# Patient Record
Sex: FEMALE | Race: OTHER | Employment: FULL TIME | URBAN - METROPOLITAN AREA
[De-identification: names, ages, dates, MRNs, and addresses within clinical notes are randomized per-mention and may not be internally consistent; named-entity substitution may affect disease eponyms.]

---

## 2017-10-10 ENCOUNTER — OFFICE VISIT (OUTPATIENT)
Dept: INTERNAL MEDICINE CLINIC | Age: 37
End: 2017-10-10

## 2017-10-10 VITALS
BODY MASS INDEX: 31.29 KG/M2 | SYSTOLIC BLOOD PRESSURE: 122 MMHG | DIASTOLIC BLOOD PRESSURE: 82 MMHG | TEMPERATURE: 98.2 F | HEIGHT: 63 IN | OXYGEN SATURATION: 98 % | HEART RATE: 69 BPM | WEIGHT: 176.6 LBS

## 2017-10-10 DIAGNOSIS — M25.561 ARTHRALGIA OF BOTH KNEES: Primary | ICD-10-CM

## 2017-10-10 DIAGNOSIS — Z00.00 ENCOUNTER FOR MEDICAL EXAMINATION TO ESTABLISH CARE: ICD-10-CM

## 2017-10-10 DIAGNOSIS — M25.562 ARTHRALGIA OF BOTH KNEES: Primary | ICD-10-CM

## 2017-10-10 DIAGNOSIS — K21.9 GASTROESOPHAGEAL REFLUX DISEASE WITHOUT ESOPHAGITIS: ICD-10-CM

## 2017-10-10 DIAGNOSIS — Z13.1 SCREENING FOR DIABETES MELLITUS: ICD-10-CM

## 2017-10-10 DIAGNOSIS — Z00.00 ROUTINE GENERAL MEDICAL EXAMINATION AT HEALTH CARE FACILITY: ICD-10-CM

## 2017-10-10 DIAGNOSIS — Z13.220 SCREENING FOR LIPID DISORDERS: ICD-10-CM

## 2017-10-10 PROCEDURE — 99203 OFFICE O/P NEW LOW 30 MIN: CPT | Performed by: NURSE PRACTITIONER

## 2017-10-10 RX ORDER — MELOXICAM 15 MG/1
15 TABLET ORAL DAILY
COMMUNITY
End: 2017-10-10 | Stop reason: CLARIF

## 2017-10-10 RX ORDER — IBUPROFEN 800 MG/1
TABLET ORAL
Qty: 80 TABLET | Refills: 1 | Status: SHIPPED | OUTPATIENT
Start: 2017-10-10 | End: 2018-01-15 | Stop reason: SDUPTHER

## 2017-10-10 RX ORDER — OMEPRAZOLE 20 MG/1
20 CAPSULE, DELAYED RELEASE ORAL DAILY
COMMUNITY
End: 2017-10-10 | Stop reason: SDUPTHER

## 2017-10-10 RX ORDER — OMEPRAZOLE 20 MG/1
20 CAPSULE, DELAYED RELEASE ORAL DAILY
Qty: 30 CAPSULE | Refills: 2 | Status: SHIPPED | OUTPATIENT
Start: 2017-10-10 | End: 2018-02-20 | Stop reason: DRUGHIGH

## 2017-10-10 NOTE — PROGRESS NOTES
Subjective:      Patient ID: Blanca Vernon is a 40 y.o. female. Patient presents with:  Established New Doctor    Presents today to establish care medical history consists of pain in her knees wrists elbows and ankles. She is a mother of 3 children, with no other health concerns. Review of Systems   Gastrointestinal:        Current symptoms consist of vomiting acid in the middle of the night with severe burning in chest   Musculoskeletal: Positive for gait problem. Myalgias: knees, elbows, wrists. All other systems reviewed and are negative. Vitals:    10/10/17 0957   BP: 122/82   Pulse: 69   Temp: 98.2 °F (36.8 °C)   SpO2: 98%     Past Medical History:   Diagnosis Date    GERD (gastroesophageal reflux disease)     Knee pain, chronic       Family History   Problem Relation Age of Onset    No Known Problems Mother     Hypertension Father     High Cholesterol Father     Diabetes Father     No Known Problems Sister     No Known Problems Brother     No Known Problems Maternal Grandfather     No Known Problems Paternal Grandmother     No Known Problems Paternal Grandfather     No Known Problems Sister       Social History     Social History    Marital status: N/A     Spouse name: N/A    Number of children: N/A    Years of education: N/A     Occupational History    Not on file. Social History Main Topics    Smoking status: Never Smoker    Smokeless tobacco: Never Used    Alcohol use No    Drug use: No    Sexual activity: Yes     Partners: Male     Birth control/ protection: Surgical     Other Topics Concern    Not on file     Social History Narrative    No narrative on file      Objective:   Physical Exam   Constitutional: She is oriented to person, place, and time. She appears well-developed and well-nourished. Neurological: She is alert and oriented to person, place, and time. Skin: Skin is warm and dry.        Assessment:      Establish care  Joint pain  Ayaka: Discussed importance of weaning self off of medication as soon as possible      Plan:    joint pain    Take ibuprofen with food    Ayaka    Sleep on back   Exercise 3 times a week for 30 minutes    Establish care    Up to date on vaccinations  Return in am for fasting labs

## 2017-10-11 DIAGNOSIS — M25.562 ARTHRALGIA OF BOTH KNEES: ICD-10-CM

## 2017-10-11 DIAGNOSIS — Z00.00 ROUTINE GENERAL MEDICAL EXAMINATION AT HEALTH CARE FACILITY: ICD-10-CM

## 2017-10-11 DIAGNOSIS — Z13.220 SCREENING FOR LIPID DISORDERS: ICD-10-CM

## 2017-10-11 DIAGNOSIS — Z13.1 SCREENING FOR DIABETES MELLITUS: ICD-10-CM

## 2017-10-11 DIAGNOSIS — M25.561 ARTHRALGIA OF BOTH KNEES: ICD-10-CM

## 2017-10-11 LAB
ALBUMIN SERPL-MCNC: 4.3 G/DL (ref 3.4–5)
ANION GAP SERPL CALCULATED.3IONS-SCNC: 17 MMOL/L (ref 3–16)
BUN BLDV-MCNC: 8 MG/DL (ref 7–20)
C-REACTIVE PROTEIN: 7.3 MG/L (ref 0–5.1)
CALCIUM SERPL-MCNC: 9.3 MG/DL (ref 8.3–10.6)
CHLORIDE BLD-SCNC: 97 MMOL/L (ref 99–110)
CHOLESTEROL, TOTAL: 193 MG/DL (ref 0–199)
CO2: 23 MMOL/L (ref 21–32)
CREAT SERPL-MCNC: 0.6 MG/DL (ref 0.6–1.1)
GFR AFRICAN AMERICAN: >60
GFR NON-AFRICAN AMERICAN: >60
GLUCOSE BLD-MCNC: 146 MG/DL (ref 70–99)
HDLC SERPL-MCNC: 40 MG/DL (ref 40–60)
LDL CHOLESTEROL CALCULATED: 111 MG/DL
PHOSPHORUS: 2.6 MG/DL (ref 2.5–4.9)
POTASSIUM SERPL-SCNC: 4.4 MMOL/L (ref 3.5–5.1)
SEDIMENTATION RATE, ERYTHROCYTE: 35 MM/HR (ref 0–20)
SODIUM BLD-SCNC: 137 MMOL/L (ref 136–145)
TRIGL SERPL-MCNC: 212 MG/DL (ref 0–150)
VITAMIN D 25-HYDROXY: 25.3 NG/ML
VLDLC SERPL CALC-MCNC: 42 MG/DL

## 2017-10-12 DIAGNOSIS — E11.9 TYPE 2 DIABETES MELLITUS WITHOUT COMPLICATION, WITHOUT LONG-TERM CURRENT USE OF INSULIN (HCC): Primary | ICD-10-CM

## 2017-10-12 LAB
ESTIMATED AVERAGE GLUCOSE: 171.4 MG/DL
HBA1C MFR BLD: 7.6 %

## 2017-10-12 RX ORDER — BLOOD-GLUCOSE METER
KIT MISCELLANEOUS
Qty: 1 KIT | Refills: 0 | Status: SHIPPED | OUTPATIENT
Start: 2017-10-12

## 2017-10-12 RX ORDER — LANCETS
1 EACH MISCELLANEOUS 2 TIMES DAILY
Qty: 100 EACH | Refills: 3 | Status: SHIPPED | OUTPATIENT
Start: 2017-10-12

## 2017-10-19 DIAGNOSIS — E11.9 TYPE 2 DIABETES MELLITUS WITHOUT COMPLICATION, WITHOUT LONG-TERM CURRENT USE OF INSULIN (HCC): Primary | ICD-10-CM

## 2017-10-19 RX ORDER — LANCETS 33 GAUGE
1 EACH MISCELLANEOUS 2 TIMES DAILY
Qty: 60 EACH | Refills: 3 | Status: SHIPPED | OUTPATIENT
Start: 2017-10-19 | End: 2018-02-20 | Stop reason: SDUPTHER

## 2017-11-14 ENCOUNTER — OFFICE VISIT (OUTPATIENT)
Dept: INTERNAL MEDICINE CLINIC | Age: 37
End: 2017-11-14

## 2017-11-14 VITALS
SYSTOLIC BLOOD PRESSURE: 118 MMHG | WEIGHT: 174 LBS | BODY MASS INDEX: 31.07 KG/M2 | OXYGEN SATURATION: 98 % | HEART RATE: 67 BPM | DIASTOLIC BLOOD PRESSURE: 76 MMHG

## 2017-11-14 DIAGNOSIS — Z23 NEED FOR INFLUENZA VACCINATION: ICD-10-CM

## 2017-11-14 DIAGNOSIS — J30.2 ACUTE SEASONAL ALLERGIC RHINITIS, UNSPECIFIED TRIGGER: ICD-10-CM

## 2017-11-14 DIAGNOSIS — Z23 NEED FOR TDAP VACCINATION: ICD-10-CM

## 2017-11-14 DIAGNOSIS — E11.9 CONTROLLED TYPE 2 DIABETES MELLITUS WITHOUT COMPLICATION, WITHOUT LONG-TERM CURRENT USE OF INSULIN (HCC): Primary | ICD-10-CM

## 2017-11-14 PROCEDURE — 90715 TDAP VACCINE 7 YRS/> IM: CPT | Performed by: NURSE PRACTITIONER

## 2017-11-14 PROCEDURE — 90471 IMMUNIZATION ADMIN: CPT | Performed by: NURSE PRACTITIONER

## 2017-11-14 PROCEDURE — 3045F PR MOST RECENT HEMOGLOBIN A1C LEVEL 7.0-9.0%: CPT | Performed by: NURSE PRACTITIONER

## 2017-11-14 PROCEDURE — 99213 OFFICE O/P EST LOW 20 MIN: CPT | Performed by: NURSE PRACTITIONER

## 2017-11-14 PROCEDURE — G8427 DOCREV CUR MEDS BY ELIG CLIN: HCPCS | Performed by: NURSE PRACTITIONER

## 2017-11-14 PROCEDURE — 90472 IMMUNIZATION ADMIN EACH ADD: CPT | Performed by: NURSE PRACTITIONER

## 2017-11-14 PROCEDURE — G8484 FLU IMMUNIZE NO ADMIN: HCPCS | Performed by: NURSE PRACTITIONER

## 2017-11-14 PROCEDURE — 90686 IIV4 VACC NO PRSV 0.5 ML IM: CPT | Performed by: NURSE PRACTITIONER

## 2017-11-14 PROCEDURE — 1036F TOBACCO NON-USER: CPT | Performed by: NURSE PRACTITIONER

## 2017-11-14 PROCEDURE — G8417 CALC BMI ABV UP PARAM F/U: HCPCS | Performed by: NURSE PRACTITIONER

## 2017-11-14 RX ORDER — LEVOCETIRIZINE DIHYDROCHLORIDE 5 MG/1
5 TABLET, FILM COATED ORAL NIGHTLY
Qty: 30 TABLET | Refills: 2 | Status: SHIPPED | OUTPATIENT
Start: 2017-11-14 | End: 2018-02-20 | Stop reason: SDUPTHER

## 2017-11-14 ASSESSMENT — PATIENT HEALTH QUESTIONNAIRE - PHQ9
1. LITTLE INTEREST OR PLEASURE IN DOING THINGS: 0
SUM OF ALL RESPONSES TO PHQ QUESTIONS 1-9: 0
SUM OF ALL RESPONSES TO PHQ9 QUESTIONS 1 & 2: 0
2. FEELING DOWN, DEPRESSED OR HOPELESS: 0

## 2017-11-14 ASSESSMENT — ENCOUNTER SYMPTOMS: SINUS PRESSURE: 1

## 2017-11-14 NOTE — PATIENT INSTRUCTIONS
Allergies  -start taking Xyzal nightly   Diabetes  -Take Metformin as prescribed  -Call the office with concerns regarding medications before stopping  Knee pain  -Start taking ibuprofen 800 mg three times daily with food as previously prescribed  -Apply heat to knee at least twice daily  Start taking xyzal nightly          Vaccine Information Sheet, \"Influenza - Inactivated\"  given to Sharri Alfaro, or parent/legal guardian of  Sharri Alfaro and verbalized understanding. Patient responses:    Have you ever had a reaction to a flu vaccine? No  Are you able to eat eggs without adverse effects? Yes  Do you have any current illness? No  Have you ever had Guillian [place] Syndrome? No    Flu vaccine given per order. Please see immunization tab.

## 2017-12-13 NOTE — PATIENT INSTRUCTIONS
Take ibuprofen with food  Sleep on back   Exercise 3 times a week for 30 minutes  Return in am for fasting labs
102

## 2018-01-19 RX ORDER — IBUPROFEN 800 MG/1
TABLET ORAL
Qty: 80 TABLET | Refills: 0 | Status: SHIPPED | OUTPATIENT
Start: 2018-01-19 | End: 2018-02-20 | Stop reason: SDUPTHER

## 2018-02-20 ENCOUNTER — OFFICE VISIT (OUTPATIENT)
Dept: INTERNAL MEDICINE CLINIC | Age: 38
End: 2018-02-20

## 2018-02-20 VITALS
OXYGEN SATURATION: 98 % | HEART RATE: 90 BPM | BODY MASS INDEX: 30.71 KG/M2 | SYSTOLIC BLOOD PRESSURE: 100 MMHG | WEIGHT: 172 LBS | DIASTOLIC BLOOD PRESSURE: 78 MMHG

## 2018-02-20 DIAGNOSIS — R51.9 INTRACTABLE HEADACHE, UNSPECIFIED CHRONICITY PATTERN, UNSPECIFIED HEADACHE TYPE: ICD-10-CM

## 2018-02-20 DIAGNOSIS — K21.00 GASTROESOPHAGEAL REFLUX DISEASE WITH ESOPHAGITIS: ICD-10-CM

## 2018-02-20 DIAGNOSIS — J30.2 ACUTE SEASONAL ALLERGIC RHINITIS, UNSPECIFIED TRIGGER: ICD-10-CM

## 2018-02-20 DIAGNOSIS — E11.9 TYPE 2 DIABETES MELLITUS WITHOUT COMPLICATION, WITHOUT LONG-TERM CURRENT USE OF INSULIN (HCC): Primary | ICD-10-CM

## 2018-02-20 PROCEDURE — G8417 CALC BMI ABV UP PARAM F/U: HCPCS | Performed by: NURSE PRACTITIONER

## 2018-02-20 PROCEDURE — G8427 DOCREV CUR MEDS BY ELIG CLIN: HCPCS | Performed by: NURSE PRACTITIONER

## 2018-02-20 PROCEDURE — 99214 OFFICE O/P EST MOD 30 MIN: CPT | Performed by: NURSE PRACTITIONER

## 2018-02-20 PROCEDURE — G8484 FLU IMMUNIZE NO ADMIN: HCPCS | Performed by: NURSE PRACTITIONER

## 2018-02-20 PROCEDURE — 1036F TOBACCO NON-USER: CPT | Performed by: NURSE PRACTITIONER

## 2018-02-20 PROCEDURE — 3046F HEMOGLOBIN A1C LEVEL >9.0%: CPT | Performed by: NURSE PRACTITIONER

## 2018-02-20 RX ORDER — LEVOCETIRIZINE DIHYDROCHLORIDE 5 MG/1
5 TABLET, FILM COATED ORAL NIGHTLY
Qty: 30 TABLET | Refills: 2 | Status: SHIPPED | OUTPATIENT
Start: 2018-02-20 | End: 2018-09-27 | Stop reason: SDUPTHER

## 2018-02-20 RX ORDER — OMEPRAZOLE 40 MG/1
40 CAPSULE, DELAYED RELEASE ORAL DAILY
Qty: 30 CAPSULE | Refills: 3 | Status: SHIPPED | OUTPATIENT
Start: 2018-02-20 | End: 2018-09-27 | Stop reason: SDUPTHER

## 2018-02-20 RX ORDER — LANCETS 33 GAUGE
1 EACH MISCELLANEOUS 2 TIMES DAILY
Qty: 60 EACH | Refills: 3 | Status: SHIPPED | OUTPATIENT
Start: 2018-02-20

## 2018-02-20 RX ORDER — IBUPROFEN 800 MG/1
TABLET ORAL
Qty: 60 TABLET | Refills: 0 | Status: SHIPPED | OUTPATIENT
Start: 2018-02-20 | End: 2018-09-27 | Stop reason: SDUPTHER

## 2018-02-20 RX ORDER — METFORMIN HYDROCHLORIDE 500 MG/1
2000 TABLET, EXTENDED RELEASE ORAL
Qty: 120 TABLET | Refills: 0 | Status: SHIPPED | OUTPATIENT
Start: 2018-02-20 | End: 2018-04-04 | Stop reason: SDUPTHER

## 2018-02-20 ASSESSMENT — ENCOUNTER SYMPTOMS: DIARRHEA: 1

## 2018-02-20 NOTE — PROGRESS NOTES
 Sed Rate 10/11/2017 35*       Physical Exam   HENT:   Mouth/Throat: Oropharynx is clear and moist.   Cardiovascular: Normal rate and regular rhythm. Pulmonary/Chest: Effort normal and breath sounds normal.   Abdominal: Soft. Bowel sounds are normal.       ASSESSMENT/PLAN:      Diagnoses and all orders for this visit:    Type 2 diabetes mellitus without complication, without long-term current use of insulin (HCC)  -     glucose blood VI test strips (ASCENSIA AUTODISC VI;ONE TOUCH ULTRA TEST VI) strip; 1 each by In Vitro route daily As needed. -     Andreia CARBAJAL 25I MISC; 1 applicator by Does not apply route 2 times daily  -     MICROALBUMIN, RANDOM URINE (W/O CREATININE); Future  -     Hemoglobin A1C; Future  -     metFORMIN (GLUCOPHAGE-XR) 500 MG extended release tablet; Take 4 tablets by mouth daily (with breakfast)-changed to long acting metformin to decrease GI symptoms    Gastroesophageal reflux disease with esophagitis  -     omeprazole (PRILOSEC) 40 MG delayed release capsule; Take 1 capsule by mouth daily  Increased dose to 40 mg from 20 mg    Acute seasonal allergic rhinitis, unspecified trigger  -     levocetirizine (XYZAL) 5 MG tablet; Take 1 tablet by mouth nightly    Intractable headache, unspecified chronicity pattern, unspecified headache type  -     ibuprofen (ADVIL;MOTRIN) 800 MG tablet; TAKE ONE TABLET BY MOUTH EVERY 8 HOURS AS NEEDED FOR PAIN       I have spent a total 25 minutes face-to-face with this patient and/or guardian. Over 50% of this time was spent on counseling and care coordination re: diabetes management and medication.      F/u with BLAINE Gamboa for diabetes in 6-8 weeks

## 2018-02-21 DIAGNOSIS — E11.9 TYPE 2 DIABETES MELLITUS WITHOUT COMPLICATION, WITHOUT LONG-TERM CURRENT USE OF INSULIN (HCC): ICD-10-CM

## 2018-02-21 LAB
CREATININE URINE: 173.1 MG/DL (ref 28–259)
ESTIMATED AVERAGE GLUCOSE: 151.3 MG/DL
HBA1C MFR BLD: 6.9 %
MICROALBUMIN UR-MCNC: 2.9 MG/DL
MICROALBUMIN/CREAT UR-RTO: 16.8 MG/G (ref 0–30)

## 2018-04-05 RX ORDER — LANCETS
EACH MISCELLANEOUS
Qty: 100 EACH | Refills: 2 | Status: SHIPPED | OUTPATIENT
Start: 2018-04-05

## 2018-04-16 ENCOUNTER — OFFICE VISIT (OUTPATIENT)
Dept: INTERNAL MEDICINE CLINIC | Age: 38
End: 2018-04-16

## 2018-04-16 VITALS
DIASTOLIC BLOOD PRESSURE: 84 MMHG | SYSTOLIC BLOOD PRESSURE: 112 MMHG | BODY MASS INDEX: 31.07 KG/M2 | WEIGHT: 174 LBS | OXYGEN SATURATION: 98 % | HEART RATE: 77 BPM

## 2018-04-16 DIAGNOSIS — Z23 NEED FOR 23-POLYVALENT PNEUMOCOCCAL POLYSACCHARIDE VACCINE: Primary | ICD-10-CM

## 2018-04-16 DIAGNOSIS — Z23 NEED FOR PROPHYLACTIC VACCINATION AGAINST STREPTOCOCCUS PNEUMONIAE (PNEUMOCOCCUS): ICD-10-CM

## 2018-04-16 DIAGNOSIS — E11.9 TYPE 2 DIABETES MELLITUS WITHOUT COMPLICATION, WITHOUT LONG-TERM CURRENT USE OF INSULIN (HCC): ICD-10-CM

## 2018-04-16 LAB — HBA1C MFR BLD: 7.9 %

## 2018-04-16 PROCEDURE — 83036 HEMOGLOBIN GLYCOSYLATED A1C: CPT | Performed by: NURSE PRACTITIONER

## 2018-04-16 PROCEDURE — 3044F HG A1C LEVEL LT 7.0%: CPT | Performed by: NURSE PRACTITIONER

## 2018-04-16 PROCEDURE — 90471 IMMUNIZATION ADMIN: CPT | Performed by: NURSE PRACTITIONER

## 2018-04-16 PROCEDURE — 1036F TOBACCO NON-USER: CPT | Performed by: NURSE PRACTITIONER

## 2018-04-16 PROCEDURE — 99213 OFFICE O/P EST LOW 20 MIN: CPT | Performed by: NURSE PRACTITIONER

## 2018-04-16 PROCEDURE — G8427 DOCREV CUR MEDS BY ELIG CLIN: HCPCS | Performed by: NURSE PRACTITIONER

## 2018-04-16 PROCEDURE — 2022F DILAT RTA XM EVC RTNOPTHY: CPT | Performed by: NURSE PRACTITIONER

## 2018-04-16 PROCEDURE — G8417 CALC BMI ABV UP PARAM F/U: HCPCS | Performed by: NURSE PRACTITIONER

## 2018-04-16 PROCEDURE — 90732 PPSV23 VACC 2 YRS+ SUBQ/IM: CPT | Performed by: NURSE PRACTITIONER

## 2018-07-23 ENCOUNTER — OFFICE VISIT (OUTPATIENT)
Dept: INTERNAL MEDICINE CLINIC | Age: 38
End: 2018-07-23

## 2018-07-23 VITALS — HEART RATE: 89 BPM | DIASTOLIC BLOOD PRESSURE: 70 MMHG | SYSTOLIC BLOOD PRESSURE: 114 MMHG | OXYGEN SATURATION: 98 %

## 2018-07-23 DIAGNOSIS — E11.9 TYPE 2 DIABETES MELLITUS WITHOUT COMPLICATION, WITHOUT LONG-TERM CURRENT USE OF INSULIN (HCC): Primary | ICD-10-CM

## 2018-07-23 LAB — GLUCOSE BLD-MCNC: 254 MG/DL

## 2018-07-23 PROCEDURE — 99213 OFFICE O/P EST LOW 20 MIN: CPT | Performed by: NURSE PRACTITIONER

## 2018-07-23 PROCEDURE — 82962 GLUCOSE BLOOD TEST: CPT | Performed by: NURSE PRACTITIONER

## 2018-07-23 NOTE — PATIENT INSTRUCTIONS
Need to exercise  Continue to exercise for 30 minutes, twice a week  Return in a.m. for lab work    CPap smear to be scheduled in 2 months  I reports sent to office ontinue to watch food portions

## 2018-07-24 DIAGNOSIS — E11.9 TYPE 2 DIABETES MELLITUS WITHOUT COMPLICATION, WITHOUT LONG-TERM CURRENT USE OF INSULIN (HCC): ICD-10-CM

## 2018-07-24 LAB
CHOLESTEROL, TOTAL: 190 MG/DL (ref 0–199)
ESTIMATED AVERAGE GLUCOSE: 159.9 MG/DL
HBA1C MFR BLD: 7.2 %
HDLC SERPL-MCNC: 45 MG/DL (ref 40–60)
LDL CHOLESTEROL CALCULATED: 113 MG/DL
TRIGL SERPL-MCNC: 161 MG/DL (ref 0–150)
VLDLC SERPL CALC-MCNC: 32 MG/DL

## 2018-09-27 ENCOUNTER — OFFICE VISIT (OUTPATIENT)
Dept: INTERNAL MEDICINE CLINIC | Age: 38
End: 2018-09-27
Payer: COMMERCIAL

## 2018-09-27 VITALS
HEART RATE: 83 BPM | DIASTOLIC BLOOD PRESSURE: 70 MMHG | OXYGEN SATURATION: 98 % | WEIGHT: 169 LBS | SYSTOLIC BLOOD PRESSURE: 110 MMHG | BODY MASS INDEX: 30.18 KG/M2

## 2018-09-27 DIAGNOSIS — E11.9 TYPE 2 DIABETES MELLITUS WITHOUT COMPLICATION, WITHOUT LONG-TERM CURRENT USE OF INSULIN (HCC): ICD-10-CM

## 2018-09-27 DIAGNOSIS — Z01.419 ENCOUNTER FOR WELL WOMAN EXAM WITH ROUTINE GYNECOLOGICAL EXAM: Primary | ICD-10-CM

## 2018-09-27 DIAGNOSIS — K21.00 GASTROESOPHAGEAL REFLUX DISEASE WITH ESOPHAGITIS: ICD-10-CM

## 2018-09-27 DIAGNOSIS — Z23 NEED FOR INFLUENZA VACCINATION: ICD-10-CM

## 2018-09-27 DIAGNOSIS — R51.9 INTRACTABLE HEADACHE, UNSPECIFIED CHRONICITY PATTERN, UNSPECIFIED HEADACHE TYPE: ICD-10-CM

## 2018-09-27 PROCEDURE — 90471 IMMUNIZATION ADMIN: CPT | Performed by: NURSE PRACTITIONER

## 2018-09-27 PROCEDURE — 90686 IIV4 VACC NO PRSV 0.5 ML IM: CPT | Performed by: NURSE PRACTITIONER

## 2018-09-27 PROCEDURE — 99395 PREV VISIT EST AGE 18-39: CPT | Performed by: NURSE PRACTITIONER

## 2018-09-27 RX ORDER — LEVOCETIRIZINE DIHYDROCHLORIDE 5 MG/1
5 TABLET, FILM COATED ORAL NIGHTLY
Qty: 30 TABLET | Refills: 2 | Status: SHIPPED | OUTPATIENT
Start: 2018-09-27 | End: 2019-03-27

## 2018-09-27 RX ORDER — OMEPRAZOLE 40 MG/1
40 CAPSULE, DELAYED RELEASE ORAL DAILY
Qty: 30 CAPSULE | Refills: 3 | Status: SHIPPED | OUTPATIENT
Start: 2018-09-27 | End: 2019-03-27

## 2018-09-27 RX ORDER — IBUPROFEN 800 MG/1
TABLET ORAL
Qty: 60 TABLET | Refills: 0 | Status: SHIPPED | OUTPATIENT
Start: 2018-09-27 | End: 2020-01-13 | Stop reason: SDUPTHER

## 2018-09-27 ASSESSMENT — PATIENT HEALTH QUESTIONNAIRE - PHQ9
2. FEELING DOWN, DEPRESSED OR HOPELESS: 0
SUM OF ALL RESPONSES TO PHQ QUESTIONS 1-9: 0
SUM OF ALL RESPONSES TO PHQ QUESTIONS 1-9: 0
1. LITTLE INTEREST OR PLEASURE IN DOING THINGS: 0
SUM OF ALL RESPONSES TO PHQ9 QUESTIONS 1 & 2: 0

## 2018-10-04 ENCOUNTER — TELEPHONE (OUTPATIENT)
Dept: INTERNAL MEDICINE CLINIC | Age: 38
End: 2018-10-04

## 2019-03-27 ENCOUNTER — OFFICE VISIT (OUTPATIENT)
Dept: INTERNAL MEDICINE CLINIC | Age: 39
End: 2019-03-27

## 2019-03-27 VITALS
DIASTOLIC BLOOD PRESSURE: 80 MMHG | HEART RATE: 91 BPM | SYSTOLIC BLOOD PRESSURE: 104 MMHG | OXYGEN SATURATION: 98 % | BODY MASS INDEX: 29.82 KG/M2 | WEIGHT: 167 LBS

## 2019-03-27 DIAGNOSIS — R51.9 INTRACTABLE HEADACHE, UNSPECIFIED CHRONICITY PATTERN, UNSPECIFIED HEADACHE TYPE: ICD-10-CM

## 2019-03-27 DIAGNOSIS — K21.00 GASTROESOPHAGEAL REFLUX DISEASE WITH ESOPHAGITIS: ICD-10-CM

## 2019-03-27 DIAGNOSIS — E11.9 TYPE 2 DIABETES MELLITUS WITHOUT COMPLICATION, WITHOUT LONG-TERM CURRENT USE OF INSULIN (HCC): Primary | ICD-10-CM

## 2019-03-27 PROCEDURE — 99212 OFFICE O/P EST SF 10 MIN: CPT | Performed by: NURSE PRACTITIONER

## 2019-03-27 RX ORDER — LEVOCETIRIZINE DIHYDROCHLORIDE 5 MG/1
5 TABLET, FILM COATED ORAL NIGHTLY
Qty: 30 TABLET | Refills: 2 | Status: CANCELLED | OUTPATIENT
Start: 2019-03-27

## 2019-03-27 RX ORDER — OMEPRAZOLE 40 MG/1
40 CAPSULE, DELAYED RELEASE ORAL DAILY
Qty: 30 CAPSULE | Refills: 3 | Status: CANCELLED | OUTPATIENT
Start: 2019-03-27

## 2019-03-27 RX ORDER — IBUPROFEN 800 MG/1
TABLET ORAL
Qty: 60 TABLET | Refills: 0 | Status: CANCELLED | OUTPATIENT
Start: 2019-03-27

## 2019-03-27 ASSESSMENT — PATIENT HEALTH QUESTIONNAIRE - PHQ9
SUM OF ALL RESPONSES TO PHQ QUESTIONS 1-9: 0
SUM OF ALL RESPONSES TO PHQ9 QUESTIONS 1 & 2: 0
2. FEELING DOWN, DEPRESSED OR HOPELESS: 0
1. LITTLE INTEREST OR PLEASURE IN DOING THINGS: 0
SUM OF ALL RESPONSES TO PHQ QUESTIONS 1-9: 0

## 2019-03-27 ASSESSMENT — ENCOUNTER SYMPTOMS: BLURRED VISION: 0

## 2020-01-08 ENCOUNTER — NURSE ONLY (OUTPATIENT)
Dept: INTERNAL MEDICINE CLINIC | Age: 40
End: 2020-01-08

## 2020-01-08 PROCEDURE — 90686 IIV4 VACC NO PRSV 0.5 ML IM: CPT | Performed by: NURSE PRACTITIONER

## 2020-01-08 PROCEDURE — 90471 IMMUNIZATION ADMIN: CPT | Performed by: NURSE PRACTITIONER

## 2020-01-13 ENCOUNTER — TELEPHONE (OUTPATIENT)
Dept: INTERNAL MEDICINE CLINIC | Age: 40
End: 2020-01-13

## 2020-01-13 RX ORDER — IBUPROFEN 800 MG/1
TABLET ORAL
Qty: 60 TABLET | Refills: 0 | Status: SHIPPED | OUTPATIENT
Start: 2020-01-13 | End: 2020-03-04 | Stop reason: SDUPTHER

## 2020-01-13 NOTE — TELEPHONE ENCOUNTER
Patient needs refill of ibuprofen 800 and metformin 1000, patient has a new pharmacy [address] [phone] patient is [facility] march 3

## 2020-03-04 ENCOUNTER — OFFICE VISIT (OUTPATIENT)
Dept: INTERNAL MEDICINE CLINIC | Age: 40
End: 2020-03-04

## 2020-03-04 VITALS
HEART RATE: 74 BPM | RESPIRATION RATE: 16 BRPM | SYSTOLIC BLOOD PRESSURE: 122 MMHG | WEIGHT: 169.8 LBS | TEMPERATURE: 98.2 F | BODY MASS INDEX: 30.32 KG/M2 | DIASTOLIC BLOOD PRESSURE: 66 MMHG | OXYGEN SATURATION: 98 %

## 2020-03-04 LAB — HBA1C MFR BLD: 6.8 %

## 2020-03-04 PROCEDURE — 99213 OFFICE O/P EST LOW 20 MIN: CPT | Performed by: INTERNAL MEDICINE

## 2020-03-04 PROCEDURE — 83036 HEMOGLOBIN GLYCOSYLATED A1C: CPT | Performed by: INTERNAL MEDICINE

## 2020-03-04 RX ORDER — ATORVASTATIN CALCIUM 10 MG/1
10 TABLET, FILM COATED ORAL DAILY
Qty: 90 TABLET | Refills: 1 | Status: SHIPPED | OUTPATIENT
Start: 2020-03-04 | End: 2020-09-08 | Stop reason: SDUPTHER

## 2020-03-04 RX ORDER — IBUPROFEN 800 MG/1
TABLET ORAL
Qty: 60 TABLET | Refills: 5 | Status: SHIPPED | OUTPATIENT
Start: 2020-03-04 | End: 2020-09-08

## 2020-03-04 RX ORDER — OMEPRAZOLE 40 MG/1
40 CAPSULE, DELAYED RELEASE ORAL DAILY
Qty: 90 CAPSULE | Refills: 1 | Status: SHIPPED | OUTPATIENT
Start: 2020-03-04 | End: 2020-09-08 | Stop reason: SDUPTHER

## 2020-03-04 ASSESSMENT — PATIENT HEALTH QUESTIONNAIRE - PHQ9
SUM OF ALL RESPONSES TO PHQ QUESTIONS 1-9: 0
1. LITTLE INTEREST OR PLEASURE IN DOING THINGS: 0
SUM OF ALL RESPONSES TO PHQ9 QUESTIONS 1 & 2: 0
SUM OF ALL RESPONSES TO PHQ QUESTIONS 1-9: 0
2. FEELING DOWN, DEPRESSED OR HOPELESS: 0

## 2020-03-04 NOTE — PROGRESS NOTES
(GLUCOPHAGE) 1000 MG tablet Take 1 tablet by mouth 2 times daily (with meals) 180 tablet 0    ibuprofen (ADVIL;MOTRIN) 800 MG tablet TAKE ONE TABLET BY MOUTH EVERY 8 HOURS AS NEEDED FOR PAIN 60 tablet 0    esomeprazole (NEXIUM) 20 MG delayed release capsule Take 1 capsule by mouth every morning (before breakfast) 40 capsule 0    KROGER LANCETS ULTRATHIN 30G MISC USE TO TEST TWO TIMES A  each 2    glucose blood VI test strips (ASCENSIA AUTODISC VI;ONE TOUCH ULTRA TEST VI) strip 1 each by In Vitro route daily As needed. 100 each 3    ONETOUCH DELICA LANCETS 52Y MISC 1 applicator by Does not apply route 2 times daily 60 each 3    Blood Glucose Monitoring Suppl KIT One touch glucometer kit 1 kit 0    glucose monitoring kit (FREESTYLE) monitoring kit Accu-Chek glucometer kit 1 kit 0    Accu-Chek Multiclix Lancets MISC 1 applicator by Does not apply route 2 times daily 100 each 3     No current facility-administered medications for this visit. Vitals:    03/04/20 0926   BP: 122/66   Pulse: 74   Resp: 16   Temp: 98.2 °F (36.8 °C)   TempSrc: Oral   SpO2: 98%   Weight: 169 lb 12.8 oz (77 kg)     Body mass index is 30.32 kg/m². Wt Readings from Last 3 Encounters:   03/04/20 169 lb 12.8 oz (77 kg)   03/27/19 167 lb (75.8 kg)   09/27/18 169 lb (76.7 kg)     BP Readings from Last 3 Encounters:   03/04/20 122/66   03/27/19 104/80   09/27/18 110/70       Objective:   Physical Exam  Vitals signs and nursing note reviewed. Constitutional:       Appearance: She is well-developed. HENT:      Head: Normocephalic and atraumatic. Right Ear: External ear normal.      Left Ear: External ear normal.      Nose: Nose normal.   Eyes:      Conjunctiva/sclera: Conjunctivae normal.      Pupils: Pupils are equal, round, and reactive to light. Neck:      Musculoskeletal: Normal range of motion and neck supple. Thyroid: No thyromegaly. Cardiovascular:      Rate and Rhythm: Normal rate and regular rhythm.

## 2020-03-04 NOTE — PATIENT INSTRUCTIONS
Patient Education        Noninsulin Medicines for Type 2 Diabetes: Care Instructions  Your Care Instructions    There are different types of noninsulin medicines for diabetes. Each works in a different way. But they all help you control your blood sugar. Some types help your body make insulin to lower your blood sugar. Others lower how much insulin your body needs. Some can slow how fast your body digests sugars. And some can remove extra glucose through your urine. · Alpha-glucosidase inhibitors. These keep starches from breaking down. This means that they lower the amount of glucose absorbed when you eat. They don't help your body make more insulin. So they will not cause low blood sugar unless you use them with other medicines for diabetes. They include acarbose and miglitol. · DPP-4 inhibitors. These help your body raise the level of insulin after you eat. They also help your body make less of a hormone that raises blood sugar. They include linagliptin, saxagliptin, and sitagliptin. · Incretin hormones (GLP-1 receptor agonists). Your body makes a protein that can raise your insulin level. It also can lower your blood sugar and make you less hungry. You can get shots of hormones that work the same way. They include exenatide and liraglutide. · Meglitinides. These help your body release insulin. They also help slow how your body digests sugars. So they can keep your blood sugar from rising too fast after you eat. They include nateglinide and repaglinide. · Metformin. This lowers how much glucose your liver makes. And it helps you respond better to insulin. It also lowers the amount of stored sugar that your liver releases when you are not eating. · SGLT2 inhibitors. These help to remove extra glucose through your urine. They may also help some people lose weight. They include canagliflozin, dapagliflozin, and empagliflozin. · Sulfonylureas. These help your body release more insulin.  Some work for [employer] · Your blood sugar stays outside the level your doctor set for you.     · You have any problems. Where can you learn more? Go to [email or web address] and sign in to your R[employer] account. Enter H153 in the [facility] box to learn more about \"Noninsulin Medicines for Type 2 Diabetes: Care Instructions. \"     If you do not have an account, please click on the \"Sign Up Now\" link. Current as of: April 16, 2019  Content Version: 12.3  © 7020-1185 [employer]. Care instructions adapted under license by [facility] ([facility]). If you have questions about a medical condition or this instruction, always ask your healthcare professional. [address] any warranty or liability for your use of this information. Patient Education        Learning About the Risk of Heart Attack and Stroke With Diabetes  How are diabetes, heart attack, and stroke connected? For some people, diabetes can cause problems that increase the risk of a heart attack or stroke. Many things can lead to a heart attack or stroke. These include high blood sugar, insulin resistance, high cholesterol, and high blood pressure. Lifestyle and genetics may also play a part. But here's the good news: The things you're doing to stay healthy with diabetes also help your heart and blood vessels. That means eating healthy foods, quitting smoking, and getting exercise. What increases your risk for heart attack and stroke? When you have diabetes, your risk for heart attack and stroke is even higher if you have:  · High blood pressure. It pushes blood through the arteries with too much force. Over time, this damages the walls of the arteries. · High cholesterol. It causes the buildup of a kind of fat inside the blood vessel walls. This buildup can lower blood flow to the heart muscle and raise your risk for having a heart attack or stroke. · Kidney damage.  It shares many of the risk factors for healthcare professional. [address] any warranty or liability for your use of this information. Patient Education        Noninsulin Medicines for Type 2 Diabetes: Care Instructions  Your Care Instructions    There are different types of noninsulin medicines for diabetes. Each works in a different way. But they all help you control your blood sugar. Some types help your body make insulin to lower your blood sugar. Others lower how much insulin your body needs. Some can slow how fast your body digests sugars. And some can remove extra glucose through your urine. · Alpha-glucosidase inhibitors. These keep starches from breaking down. This means that they lower the amount of glucose absorbed when you eat. They don't help your body make more insulin. So they will not cause low blood sugar unless you use them with other medicines for diabetes. They include acarbose and miglitol. · DPP-4 inhibitors. These help your body raise the level of insulin after you eat. They also help your body make less of a hormone that raises blood sugar. They include linagliptin, saxagliptin, and sitagliptin. · Incretin hormones (GLP-1 receptor agonists). Your body makes a protein that can raise your insulin level. It also can lower your blood sugar and make you less hungry. You can get shots of hormones that work the same way. They include exenatide and liraglutide. · Meglitinides. These help your body release insulin. They also help slow how your body digests sugars. So they can keep your blood sugar from rising too fast after you eat. They include nateglinide and repaglinide. · Metformin. This lowers how much glucose your liver makes. And it helps you respond better to insulin. It also lowers the amount of stored sugar that your liver releases when you are not eating. · SGLT2 inhibitors. These help to remove extra glucose through your urine. They may also help some people lose weight.  They include · Your blood sugar is very high or very low.    Watch closely for changes in your health, and be sure to contact your doctor if:    · Your blood sugar stays outside the level your doctor set for you.     · You have any problems. Where can you learn more? Go to [email or web address].[email or web address].com. org and sign in to your [employer] account. Enter H153 in the [employer] box to learn more about \"Noninsulin Medicines for Type 2 Diabetes: Care Instructions. \"     If you do not have an account, please click on the \"Sign Up Now\" link. Current as of: April 16, 2019  Content Version: 12.3  © 9901-1275 [employer]. Care instructions adapted under license by [facility] ([facility]). If you have questions about a medical condition or this instruction, always ask your healthcare professional. [address] any warranty or liability for your use of this information.

## 2020-03-26 ASSESSMENT — ENCOUNTER SYMPTOMS
NAUSEA: 1
EYES NEGATIVE: 1
BACK PAIN: 0

## 2020-09-08 ENCOUNTER — OFFICE VISIT (OUTPATIENT)
Dept: INTERNAL MEDICINE CLINIC | Age: 40
End: 2020-09-08

## 2020-09-08 VITALS — DIASTOLIC BLOOD PRESSURE: 80 MMHG | SYSTOLIC BLOOD PRESSURE: 116 MMHG | WEIGHT: 165 LBS | BODY MASS INDEX: 29.46 KG/M2

## 2020-09-08 LAB
A/G RATIO: 1.5 (ref 1.1–2.2)
ALBUMIN SERPL-MCNC: 4.6 G/DL (ref 3.4–5)
ALP BLD-CCNC: 131 U/L (ref 40–129)
ALT SERPL-CCNC: 52 U/L (ref 10–40)
ANION GAP SERPL CALCULATED.3IONS-SCNC: 13 MMOL/L (ref 3–16)
AST SERPL-CCNC: 44 U/L (ref 15–37)
BILIRUB SERPL-MCNC: 0.4 MG/DL (ref 0–1)
BUN BLDV-MCNC: 10 MG/DL (ref 7–20)
CALCIUM SERPL-MCNC: 9.9 MG/DL (ref 8.3–10.6)
CHLORIDE BLD-SCNC: 101 MMOL/L (ref 99–110)
CHOLESTEROL, FASTING: 135 MG/DL (ref 0–199)
CO2: 24 MMOL/L (ref 21–32)
CREAT SERPL-MCNC: 0.6 MG/DL (ref 0.6–1.1)
CREATININE URINE: 206.6 MG/DL (ref 28–259)
GFR AFRICAN AMERICAN: >60
GFR NON-AFRICAN AMERICAN: >60
GLOBULIN: 3 G/DL
GLUCOSE FASTING: 135 MG/DL (ref 70–99)
HDLC SERPL-MCNC: 49 MG/DL (ref 40–60)
LDL CHOLESTEROL CALCULATED: 66 MG/DL
MICROALBUMIN UR-MCNC: 2 MG/DL
MICROALBUMIN/CREAT UR-RTO: 9.7 MG/G (ref 0–30)
POTASSIUM SERPL-SCNC: 4.8 MMOL/L (ref 3.5–5.1)
SODIUM BLD-SCNC: 138 MMOL/L (ref 136–145)
TOTAL PROTEIN: 7.6 G/DL (ref 6.4–8.2)
TRIGLYCERIDE, FASTING: 99 MG/DL (ref 0–150)
VLDLC SERPL CALC-MCNC: 20 MG/DL

## 2020-09-08 PROCEDURE — 99213 OFFICE O/P EST LOW 20 MIN: CPT | Performed by: NURSE PRACTITIONER

## 2020-09-08 RX ORDER — AMITRIPTYLINE HYDROCHLORIDE 10 MG/1
TABLET, FILM COATED ORAL
Qty: 60 TABLET | Refills: 1 | Status: SHIPPED | OUTPATIENT
Start: 2020-09-08 | End: 2021-01-07 | Stop reason: ALTCHOICE

## 2020-09-08 RX ORDER — CLOBETASOL PROPIONATE 0.5 MG/G
OINTMENT TOPICAL
Qty: 30 G | Refills: 1 | Status: SHIPPED | OUTPATIENT
Start: 2020-09-08 | End: 2021-01-07 | Stop reason: ALTCHOICE

## 2020-09-08 RX ORDER — OMEPRAZOLE 40 MG/1
40 CAPSULE, DELAYED RELEASE ORAL DAILY
Qty: 90 CAPSULE | Refills: 1 | Status: SHIPPED | OUTPATIENT
Start: 2020-09-08 | End: 2021-01-07 | Stop reason: SDUPTHER

## 2020-09-08 RX ORDER — IBUPROFEN 800 MG/1
TABLET ORAL
Qty: 60 TABLET | Refills: 5 | Status: CANCELLED | OUTPATIENT
Start: 2020-09-08

## 2020-09-08 RX ORDER — ATORVASTATIN CALCIUM 10 MG/1
10 TABLET, FILM COATED ORAL DAILY
Qty: 90 TABLET | Refills: 1 | Status: SHIPPED | OUTPATIENT
Start: 2020-09-08 | End: 2021-01-07 | Stop reason: SDUPTHER

## 2020-09-08 ASSESSMENT — ENCOUNTER SYMPTOMS
GASTROINTESTINAL NEGATIVE: 1
RESPIRATORY NEGATIVE: 1

## 2020-09-08 NOTE — PROGRESS NOTES
Subjective:      Patient ID: Alberto Miller is a 36 y.o. female. Diabetes   She presents for her follow-up diabetic visit. She has type 2 diabetes mellitus. The initial diagnosis of diabetes was made 2 years ago. Her disease course has been fluctuating. Hypoglycemia symptoms include headaches. There are no diabetic associated symptoms. There are no hypoglycemic complications. Risk factors for coronary artery disease include family history and dyslipidemia. Current diabetic treatment includes diet. Her weight is stable. She rarely participates in exercise. She does not see a podiatrist.Eye exam is not current. Rash   This is a recurrent problem. The current episode started 1 to 4 weeks ago. The problem has been gradually worsening since onset. The affected locations include the left lower leg and right lower leg. The rash is characterized by redness, scaling and dryness. She was exposed to nothing. Past treatments include topical steroids. The treatment provided no relief. Review of Systems   HENT: Negative. Respiratory: Negative. Cardiovascular: Negative. Gastrointestinal: Negative. Skin: Positive for rash. Neurological: Positive for headaches. Vitals:    09/08/20 0857   BP: 116/80     BP Readings from Last 3 Encounters:   09/08/20 116/80   03/04/20 122/66   03/27/19 104/80     Wt Readings from Last 3 Encounters:   09/08/20 165 lb (74.8 kg)   03/04/20 169 lb 12.8 oz (77 kg)   03/27/19 167 lb (75.8 kg)     Past Medical History:   Diagnosis Date    Acute seasonal allergic rhinitis 2/20/2018    GERD (gastroesophageal reflux disease)     Intractable headache 2/20/2018    Knee pain, chronic     Type 2 diabetes mellitus without complication, without long-term current use of insulin ([address] 2/20/2018     Objective:   Physical Exam  Constitutional:       Appearance: Normal appearance. She is normal weight. HENT:      Head: Normocephalic.    Cardiovascular:      Rate and Rhythm: Normal rate

## 2020-09-08 NOTE — PATIENT INSTRUCTIONS
Continue to drink plenty of water  Exercise at ;east 3 times a week  Decrease starch intake    Apply cream to lower legs twice a day , lightly  Do not scratch over rash

## 2020-09-09 LAB
ESTIMATED AVERAGE GLUCOSE: 171.4 MG/DL
HBA1C MFR BLD: 7.6 %

## 2020-09-10 ENCOUNTER — TELEPHONE (OUTPATIENT)
Dept: INTERNAL MEDICINE CLINIC | Age: 40
End: 2020-09-10

## 2020-09-16 ENCOUNTER — TELEPHONE (OUTPATIENT)
Dept: INTERNAL MEDICINE CLINIC | Age: 40
End: 2020-09-16

## 2020-09-16 NOTE — TELEPHONE ENCOUNTER
----- Message from Michelle Ruggiero sent at 9/16/2020 12:48 PM EDT -----  Subject: Message to Provider    QUESTIONS  Information for Provider? Patient calling because she does not have   insurance and she reports BLAINE Gamboa gave her a coupon for her meds and   told her to call back if there is a problem. Patient says coupon does not   work with dapagliflozin (Karla Alfaro) 5 MG tablet   ---------------------------------------------------------------------------  --------------  CALL BACK INFO  What is the best way for the office to contact you? OK to leave message on   voicemail  Preferred Call Back Phone Number? [phone]  ---------------------------------------------------------------------------  --------------  SCRIPT ANSWERS  Relationship to Patient?  Self

## 2021-01-07 ENCOUNTER — OFFICE VISIT (OUTPATIENT)
Dept: INTERNAL MEDICINE CLINIC | Age: 41
End: 2021-01-07

## 2021-01-07 VITALS
SYSTOLIC BLOOD PRESSURE: 134 MMHG | HEART RATE: 78 BPM | DIASTOLIC BLOOD PRESSURE: 74 MMHG | TEMPERATURE: 98.3 F | OXYGEN SATURATION: 98 % | WEIGHT: 159.6 LBS | HEIGHT: 63 IN | BODY MASS INDEX: 28.28 KG/M2

## 2021-01-07 DIAGNOSIS — E11.9 TYPE 2 DIABETES MELLITUS WITHOUT COMPLICATION, WITHOUT LONG-TERM CURRENT USE OF INSULIN (HCC): Primary | ICD-10-CM

## 2021-01-07 DIAGNOSIS — E78.5 DYSLIPIDEMIA: ICD-10-CM

## 2021-01-07 DIAGNOSIS — R51.9 INTRACTABLE HEADACHE, UNSPECIFIED CHRONICITY PATTERN, UNSPECIFIED HEADACHE TYPE: ICD-10-CM

## 2021-01-07 DIAGNOSIS — K21.00 GASTROESOPHAGEAL REFLUX DISEASE WITH ESOPHAGITIS WITHOUT HEMORRHAGE: ICD-10-CM

## 2021-01-07 LAB — HBA1C MFR BLD: 7.3 %

## 2021-01-07 PROCEDURE — 99213 OFFICE O/P EST LOW 20 MIN: CPT | Performed by: NURSE PRACTITIONER

## 2021-01-07 PROCEDURE — 3051F HG A1C>EQUAL 7.0%<8.0%: CPT | Performed by: NURSE PRACTITIONER

## 2021-01-07 PROCEDURE — 83036 HEMOGLOBIN GLYCOSYLATED A1C: CPT | Performed by: NURSE PRACTITIONER

## 2021-01-07 RX ORDER — IBUPROFEN 800 MG/1
800 TABLET ORAL EVERY 8 HOURS PRN
Qty: 90 TABLET | Refills: 3 | Status: SHIPPED | OUTPATIENT
Start: 2021-01-07 | End: 2021-04-08 | Stop reason: SDUPTHER

## 2021-01-07 RX ORDER — ATORVASTATIN CALCIUM 10 MG/1
10 TABLET, FILM COATED ORAL DAILY
Qty: 90 TABLET | Refills: 3 | Status: SHIPPED | OUTPATIENT
Start: 2021-01-07 | End: 2021-04-08 | Stop reason: SDUPTHER

## 2021-01-07 RX ORDER — OMEPRAZOLE 40 MG/1
40 CAPSULE, DELAYED RELEASE ORAL DAILY
Qty: 90 CAPSULE | Refills: 3 | Status: SHIPPED | OUTPATIENT
Start: 2021-01-07 | End: 2021-07-12 | Stop reason: SDUPTHER

## 2021-01-07 RX ORDER — ASPIRIN 81 MG/1
81 TABLET ORAL DAILY
Qty: 90 TABLET | Refills: 3 | Status: SHIPPED | OUTPATIENT
Start: 2021-01-07 | End: 2021-07-12 | Stop reason: SDUPTHER

## 2021-01-07 SDOH — ECONOMIC STABILITY: TRANSPORTATION INSECURITY
IN THE PAST 12 MONTHS, HAS THE LACK OF TRANSPORTATION KEPT YOU FROM MEDICAL APPOINTMENTS OR FROM GETTING MEDICATIONS?: NO

## 2021-01-07 SDOH — ECONOMIC STABILITY: FOOD INSECURITY: WITHIN THE PAST 12 MONTHS, THE FOOD YOU BOUGHT JUST DIDN'T LAST AND YOU DIDN'T HAVE MONEY TO GET MORE.: NEVER TRUE

## 2021-01-07 SDOH — ECONOMIC STABILITY: INCOME INSECURITY: HOW HARD IS IT FOR YOU TO PAY FOR THE VERY BASICS LIKE FOOD, HOUSING, MEDICAL CARE, AND HEATING?: NOT HARD AT ALL

## 2021-01-07 ASSESSMENT — PATIENT HEALTH QUESTIONNAIRE - PHQ9
SUM OF ALL RESPONSES TO PHQ QUESTIONS 1-9: 0
1. LITTLE INTEREST OR PLEASURE IN DOING THINGS: 0
SUM OF ALL RESPONSES TO PHQ QUESTIONS 1-9: 0

## 2021-01-07 ASSESSMENT — ENCOUNTER SYMPTOMS
GASTROINTESTINAL NEGATIVE: 1
CHEST TIGHTNESS: 0
SHORTNESS OF BREATH: 0
COUGH: 0
WHEEZING: 0

## 2021-01-07 NOTE — PROGRESS NOTES
2021     Dameon Leung (:  1980) is a 36 y.o. female, here for evaluation of the following medical concerns:    Chief Complaint   Patient presents with    Diabetes     follow up     HPI  Diabetes Mellitus Type 2: Current symptoms/problems include none. Medication compliance:  compliant all of the time  Diabetic diet compliance:  compliant all of the time,  Weight trend: stable  Current exercise: walks 3 time(s) per week  Barriers: none    Home blood sugar records:   Any episodes of hypoglycemia? no  Eye exam current (within one year): yes   reports that she has never smoked. She has never used smokeless tobacco.   Daily Aspirin? No: not at this time. Lab Results   Component Value Date    LABA1C 7.3 2021    LABA1C 7.6 2020    LABA1C 6.8 2020     Lab Results   Component Value Date    LABMICR 2.00 (H) 2020    CREATININE 0.6 2020     Lab Results   Component Value Date    ALT 52 (H) 2020    AST 44 (H) 2020     Lab Results   Component Value Date    CHOL 190 2018    TRIG 161 (H) 2018    HDL 49 2020    LDLCALC 66 2020          Review of Systems   Constitutional: Negative for chills, diaphoresis, fatigue and fever. Respiratory: Negative for cough, chest tightness, shortness of breath and wheezing. Cardiovascular: Negative for chest pain, palpitations and leg swelling. Gastrointestinal: Negative. Endocrine: Negative for polydipsia, polyphagia and polyuria. Neurological: Negative for dizziness, weakness, light-headedness and headaches. Psychiatric/Behavioral: Negative. Prior to Visit Medications    Medication Sig Taking?  Authorizing Provider   omeprazole (PRILOSEC) 40 MG delayed release capsule Take 1 capsule by mouth daily Yes IVETTE Novoa - CNP   metFORMIN (GLUCOPHAGE) 1000 MG tablet Take 1 tablet by mouth 2 times daily (with meals) Yes IVETTE Novoa - CNP   atorvastatin (LIPITOR) 10 MG tablet Take 1 tablet by mouth daily Yes IVETTE Hassan CNP   aspirin EC 81 MG EC tablet Take 1 tablet by mouth daily Yes IVETTE Hassan CNP   ibuprofen (ADVIL;MOTRIN) 800 MG tablet Take 1 tablet by mouth every 8 hours as needed for Pain Yes IVETTE Hassan CNP   KROGER LANCETS ULTRATHIN 30G MISC USE TO TEST TWO TIMES A DAY Yes IVETTE Jimenez CNP   glucose blood VI test strips (ASCENSIA AUTODISC VI;ONE TOUCH ULTRA TEST VI) strip 1 each by In Vitro route daily As needed. Yes IVETTE Desouza CNP   ONETOUCH DELICA LANCETS 53N MISC 1 applicator by Does not apply route 2 times daily Yes IVETTE Desouza CNP   Blood Glucose Monitoring Suppl KIT One touch glucometer kit Yes IVETTE Jimenez CNP   glucose monitoring kit (FREESTYLE) monitoring kit Accu-Chek glucometer kit Yes IVETTE Jimenez CNP   Accu-Chek Multiclix Lancets MISC 1 applicator by Does not apply route 2 times daily Yes IVETTE Jimenez CNP        Social History     Tobacco Use    Smoking status: Never Smoker    Smokeless tobacco: Never Used   Substance Use Topics    Alcohol use: No    Drug use: No        Vitals:    01/07/21 1248   BP: 134/74   Pulse: 78   Temp: 98.3 °F (36.8 °C)   SpO2: 98%   Weight: 159 lb 9.6 oz (72.4 kg)   Height: 5' 2.75\" (1.594 m)     Estimated body mass index is 28.5 kg/m² as calculated from the following:    Height as of this encounter: 5' 2.75\" (1.594 m). Weight as of this encounter: 159 lb 9.6 oz (72.4 kg). Physical Exam  Vitals signs and nursing note reviewed. Constitutional:       General: She is awake. She is not in acute distress. Appearance: Normal appearance. She is well-developed, well-groomed and normal weight. She is not ill-appearing, toxic-appearing or diaphoretic. Cardiovascular:      Rate and Rhythm: Normal rate and regular rhythm. Heart sounds: Normal heart sounds, S1 normal and S2 normal. No murmur.    Pulmonary: Effort: Pulmonary effort is normal.      Breath sounds: Normal breath sounds. Skin:     General: Skin is warm and dry. Capillary Refill: Capillary refill takes less than 2 seconds. Neurological:      General: No focal deficit present. Mental Status: She is alert and oriented to person, place, and time. Psychiatric:         Mood and Affect: Mood normal.         Behavior: Behavior is cooperative. ASSESSMENT/PLAN:  1. Type 2 diabetes mellitus without complication, without long-term current use of insulin (HCC)  Stable/improving/continue current treatment plan  - POCT glycosylated hemoglobin (Hb A1C)  - metFORMIN (GLUCOPHAGE) 1000 MG tablet; Take 1 tablet by mouth 2 times daily (with meals)  Dispense: 180 tablet; Refill: 1  - atorvastatin (LIPITOR) 10 MG tablet; Take 1 tablet by mouth daily  Dispense: 90 tablet; Refill: 3    2. Gastroesophageal reflux disease with esophagitis  Stable  - omeprazole (PRILOSEC) 40 MG delayed release capsule; Take 1 capsule by mouth daily  Dispense: 90 capsule; Refill: 3    3. Dyslipidemia  Stable/start taking an aspirin daily  - atorvastatin (LIPITOR) 10 MG tablet; Take 1 tablet by mouth daily  Dispense: 90 tablet; Refill: 3  - aspirin EC 81 MG EC tablet; Take 1 tablet by mouth daily  Dispense: 90 tablet; Refill: 3    4. Intractable headache, unspecified chronicity pattern, unspecified headache type  Stable/refill needed  - ibuprofen (ADVIL;MOTRIN) 800 MG tablet; Take 1 tablet by mouth every 8 hours as needed for Pain  Dispense: 90 tablet; Refill: 3    Return in about 3 months (around 4/7/2021), or if symptoms worsen or fail to improve.

## 2021-01-07 NOTE — PATIENT INSTRUCTIONS
???????? ?????? ??? ???????? ????????????????? ???????? ??? ??????????? ???? ???????? ?? CDE ?? ???????? ??????? ???????? ??????????????????? ????????? ???? ????? ?????? ??? ?????? ??????????? ????? ?????????????? ?????? ???????  · ??? ????? ???????????????? ???????? ???? ???? ????? ??? ??????? ????????? ???, ????????? ????? ??????? ???? ??????? ???? ?????? ?????????? ???? ???? ??????? ???????? ???? ???????? ? ??? ??????? ?????? ???? ?????? ? ????? ???? ??? ???? ???????? ?????? ????????????????? ???????? ???? ??? ????? ??????  ? ????????????? ???????????? ????? ????? ?????? ?????????? ??????? ??? ????? ??????? ????? ???????????, ??????? ??-?????? ????? ???? ?? ???? ??????? ???? ???????? ? ??????? ?????? ????????? ????? ????? ?? ???????????? ???????? ??????????? ???? ?????? ???? ??????? ?????? ??? ???????? ????????????????? ??? ?????????? ????? ?????? ?????? ???? ??????? ???? ?????? ? 1 ?? ??? ?? ??? ???? ???????????  · ???????? ?????? ???? ???????? ????????????????? ???? ?????? ?????????? ???? ???? ????? ???? ??????? ????? ????? ???????????????????? \"???\" ???????????  · ???????????? ????? ???????? ???? ???? ?? ???? ??? ?????????????? ??????? ???????????? ??????????? ???, ????, ?????, ????, ????????, ????, ???, ???? ??? ? ?????? ??? ?????? ?????? ???? ???????? 3 ????? ????? ??? ???? ?????????? ????? ???? ????? ?????? ?????? ??????????? ???????? ????????? (1 ????? ???? ???????) ??????????? 1/4 ?? ???? ???, 1 ?????, 1 ????????? ?????? ??? ? ½ ???? ??????  ??????? ???? ????? ????????? ? ???????? ???????? ?????????????  · ??????????????? ???? ?????? ??????? ?????? ?????? ???? ??????????? ??? ??????? ???? ???? ???? ?????????? ???, ????????????? ?????? ??????? ?????? ?????? ???? ??? ??????  · ??? ??????? ????? ????????? ?????? ?????? ?????????????? ???? (????? ???, ???, ?? ??????? ????), ?????? ??????? ?????-?????????????? ???? ???? ??????????? ???? ?? ????? ?????? ???????????  · ??????? ?????????? ?????? ?????????? ??? ???????? ??? ???? ???????? ??? ????? ????? ????? ?????? ???? ????? ????????? ?? ?????? ????? ???? ???? ???? ??????????  · ??????? ???????? ?????? ????? ????? ?????????????? ??? ?????????????? ????????, ??????????? ?? ???? ??????? ?????????? ???? ??????? ???? ???? ?? ???? ????? ???????  ???????? ??? ?? ???? ???? ?????  · ??????? ????, ????? ???? ???? ????? ?????????? ????? ???? ?????????? ?? ???? ?????? ???? ?? ????? ?????? ???? ???????? ???? ????? ???? ??????? ???? ??????? ?????? ????? ?????? ? ???? ?????? ???? ??-????? ????? ???????????? ??? ?????????? ?????? ???? ?????? ?? ??????? ????? ???? ???? ???????? ??? ?????? ??????????  · ???? ??? ??????????? ??????? ???? ????????? ? ???????? ???? ???????? ???? ???? ??????? ??????? ??? ??????? ????? ?????? ???? ???? ?? ????? ?????  · ??????? ???? ???? ??? ????? ???????? ???? ??????????? ?????? ??????? ????? ????????? ????? ???? ?? ?????? ?? ??????? ???????? ???? ???? ??????? ??????? ??? ?????? ??????? ??????????? ??? ????? ?????  ???-?? ?????? ??????? ????? ??? ????????? ????? ??? ??? ??? ????????????? ??? ??? ????? ??????? ????????? ???? ??? ???????? ????????? ??????? ? ??? ????? ????????? ?? ?????????? ????? ?????? ????????? ???? ????? ??? ????? ???? ????????? ???? ?????? ??? ?? ?????? ????? ???  ??????? ?? ???? ????? ???????????  ???? ????????   [email or web address]  ???????? ????????? I147 ?????? ?? ????? ??? ?????? \"?????? ?????????? ?????????????????? ?????? ?????.\"  ?? ????? ???: 2019 12 20               ????????? ?????: 12.6  © [phone] Healthwise, Incorporated. ???????? ?????????????? ?????? ???????? ???????? ??????? ????????? ?????? ????? ??????? ??? ???????? ?? ???????? ?????? ?? ?? ?????????? ?????? ????????? ??? ???, ???? ????? ????????? ?????? ????? ????????? ??????????? Healthwise, Incorporated, ?? ??????? ?? ????????? ???????? ???? ???? ??? ???????? ?? ?????????? ???????? ??????          Patient Education        ?????? ???????? ???????? ?????????: ??????? ???????????  Diabetes Foot Health: Care Instructions  ??????? ??????? ???????????     ???????? ?????? ?????, ??????? ????????? ???????? ??????? ? ????? ?????? ????? ???????? ???????? ????? ?????? ????????? ???? ???? ????? ??????? ?? ????? ??????? ???????? ?????? ???? ?????? ? ?????? ???? ???? ???????? ????? ???????? ????? ???????? ?????????? ????? ? ??????? ?????? ?????? ???? ???????????? ??? ????????? ??????? ?????? ????????? ??? ????? ????? ???????? ???????? ??????? ?????? ?????? ???, ?? ????? ?? ?????? ??????? ???? ????? ?????? ?????? ???????????, ??????? ??????? ?? ???????????? ????? ???????????  ??????? ???????? ??????? ???? ???? ? ????? ??? ????? ??????? ??????? ????? ???????? ?? ?????????? ??? ???? ????? ???? ???????  ???-?? ?????? ??????? ????? ??? ????????? ????? ??? ??? ??? ????????????? ??? ??? ????? ??????? ????????? ???? ??? ???????? ????????? ??????? ? ??? ????? ????????? ?? ?????????? ????? ?????? ????????? ???? ????? ??? ????? ???? ????????? ???? ?????? ??? ?? ?????? ????? ???  ??????? ???? ????? ??????? ???? ???? ???????????  · ??????? ?? ? ??? ????????? ????? ?????, ???? ???? ?????? ????, ????????? ???? ???? ? ?????? ????????? ????, ????? ???? ???? ????????? ???? ???????????  · ???????? ??????????? ?????????? ???? ???????? ????????? ????? ? ???????? ????????? ?? ???? ????? ????? ??? ???????? ??????? ????? ???????, ????? ????????? ????????-?????? ????????? ? ????????? ?????? ???? ?????????? ????? ??????? ???????? ???? ???????? ???? ????? ???????  · ???????? ?? ???? ???????? ????????? ???? ????? ??????? ??????? ???? ??? ? ????? ???? ??????? ?????? ??? ?????  · ???????, ??????? ??????, ?????????? ?? ?????????? ???? ????? ????????? ????? ????? ???? ?????????? ??? ??????? ????????? ????? ?????????? ???, ????? ?????? ????????? ?? ?????? ??????? ???? ????????????  · ????? ???????? ????? ??????????:  ? ????? ????????? ???????? ??? ????????? ?????? ???? ?????? ????????? (???? ????)? ??????? ??????? ?? ?????? ???? ????? ?????? ??????? ???? ????????? ?? ???????? ?????  ? ????? ????????? ????????? ?????? ??????????? ?????????? ?????? ??????? ??????????? ????? ???????? ?????? ???? ??? ??????? ???????????? ??????? ???????? ????????? ????? ????????? ?????? ??????????? ??? ??????? ???????? ???? ????? ???, ???????????? ?? ????? ??????? ??? ?????? ???? ????? ??????? ????????  ? ????? ??????? ??? ??????????? ??????? ???????? ???? ??? ????? ? ???????? (calluses) ? ?????????? ?????? ?????? ???? ??????????? ???? ??????? ?????? ?????????? ?? ??????? ???????? ??????? ????? ????? ??????????? ? ?????? ??? ????? ???? ??? ??????? ?????? ???????????  ? ??????????? ????? ???????? ??????? ????? ?????? ??? ?????????? ??????? ???????? ??????? ???? ?????? ??? ?????? ???? ???? ??????? ?????? ??????????? ??? ?????? ?????? ?????? ?? ???? ??????? ??????? ?????? ??????????  ? ???????? ????? ??????? ??????? ???? ???? ????? ???????? ????? ???? ? ????? ?????????? ??? ??????????? ?????? ?????????, ??????? ????? ???????????? ???? ??????? ???? ???? ????? (emery board) ? ? ?????? ??????????  · ????? ????? ??????? ???????? ?????????? ????? ?? ?????? ??????? ???????? ?????? ????? ?????? ????? ???? ???????? ????????? ?????? ????? ????????? ??????????? ?????? ???? ?????????? ???? ??????? ????? ????? ???????????  · ???????? ??? ??????? ?? ???????? ?????????? ??????? ?? ????????? ???????? ????? ?????????? ???? ????? ??????????? ????? ???????????  · ????????? ??? ???? ????????? ???? ?????????:  ? ???????? ????????? ?????? ?????? ???? ???? ???? ????????? ??????????? ???? ??????? ? ??????? ?????? ???? ????? ?????  ? ??????? ????? ????????? ?????? ??????? ???????? ?????? ?????? ??????????  ? ??????????? ????????? ???????????? ????????? ??????? ????????? ????? ? ??????? ?????????? ??????? ?????? ????????? ?????? ? ??????? ????????????? ????? ???????? ????? ???? ?? ???????  ? ???? ????? ???? ????? ?? ?????????? ??????? ???? ????????? ??????? ???????? ??????? ??????????? ??????? ???? ????????? ?????????? ???? ??????, ??????? ?? ???? ??????????? ???? ????? ????????? ???? ???? ???????? ??? ????????????  · ?????? ???? ?????????? ????? ????? ??? ???? ??????????? ? ????????? ???????????? ????? ???????? ???? ????? ??????? ????????? ??????? ????????? ???? ??? ?? ???? ??????? ??? ??????? ?????????  · ???????? ????? ????? ????? ????????? ????? ?????? ?????? ?????????? ??? ???????? ???????? ?????? ? ???, ????? ????????? ??? ?????????? ???? ???????? ???????? ???? ???????? ????? ????? ?????? ??????????? ????? ???????? ?? ??????? ????????? ???? ???? ???? ????????? ???????? (????? ???? ?????????) ???????? ??????????  · ???????? ??????????? ???? ???? ???? ????? ?????????? ??? ????? ?? ????????? ??????? ????? ??? ???? ????????? ???? ?????? ???????? ?????  ??????? ??????? ???? ????? ??? ??????????  ????? ????????? ????? ?? ????????? ?? ???????? ???????? ??????? ??????????, ???:  · ??????? ?????? ?????? ???, ????? ? ?? ? 4 ??? ???? ???? ?????? ? ? ?? ???? ???? ???, ????? ?????? ????? ?? ????? ????? ????????? ?????, ?? ???????? ?????? ?????? ????  · ??????? ????? ???? ?? ???? ??? ??? ???, ?????? ????? ?? ???? ???????? ????????? ??? ????? ????????  · ??????? ???? ????????? ? ?? ???????? ?????? ???? ??????? ????????? ??? ?? ??????? ???? ??????? ???? ????  · ???????? 24 ?????????? ??? ????? ???? ? ??? ?????? ?????? ??? ????  · ???????? ?????? ??????? ?? ???????? ??? ???? ???? ???? ? ? ?? ????????? ???? ???? ???????? ????? ??? ???? ??????? ??? ?????? ????  · ??????? ?????? ?? ????????? ????? ?? ???????? ??????? ??????? ????  ??????? ??????????? ???? ????????????? ???? ???????? ??????? ????????? ? ??? ????? ???? ????? ????????? ??????? ???? ??????? ?????????:  · ??????? ???????? ??????? ??????? ???? ???????????  ??????? ?? ???? ????? ???????????  ???? ????????   [email or web address]  ???????? ?????????  A739 ?????? ?? ????? ??? ?????? \"?????? ???????? ???????? ?????????: ??????? ???????????.\"  ?? ????? ???: 2019 12 20               ????????? ?????: 12.6  © [id number] Healthwise, Incorporated. ???????? ?????????????? ?????? ???????? ???????? ??????? ????????? ?????? ????? ??????? ??? ???????? ?? ???????? ?????? ?? ?? ?????????? ?????? ????????? ??? ???, ???? ????? ????????? ?????? ????? ????????? ??????????? Healthwise, Incorporated, ?? ??????? ?? ????????? ???????? ???? ???? ??? ???????? ?? ?????????? ???????? ?????? Patient Education        ?????? 2 ??????: ??????? ???????????   Type 2 Diabetes: Care Instructions  ??????? ??????? ???????????    ?????? 2 ?????? ?? ??? ?? ??? ?????? ??????? ???????? ????????? ?????? ???? ?????? ????? ?????? ???????, ??????????????? ???????? ???????? ????? ??????? ???????? ???? ?????? ?? ???? ?????? ????????? ??????? ???? ???? (???????) ?????? ???? ????? ????? ???? ??????? ????????, ????, ? ??????? ???????? ???? ???? ??????? ???? ????? ?????  ????????????, ???? ????? ??? ???? ???????? ??? ??????? ?????? ??????? ?? ????? ????? ???? ????? ?????? ?????? ???? ??????? ????? ???? ???????? ???? ???? ??????? ????? ?????????? ?????? ??????? ??? ?????? ?????, ???????? ????? ????, ???? ??????, ????????, ???????? ? ?????? ?????? ??? ???????  ????? ??? ?????, ?????? ???? ????, ? ????? ??????? ???? ????? ????? ?????? ?????? ????????? ???? ??????????? ??????? ???????? ?? ???????? ??? ???? ????????? ??? ??? ?????  ???-?? ?????? ??????? ????? ??? ????????? ????? ??? ??? ??? ??????????????? ????? ? ??? ????????? ????????? ??? ???????? ????????? ???? ????? ????????? ?? ?????????? ????? ?????? ????????? ???? ????? ??? ????? ???? ????????? ???? ?????? ??? ?? ?????? ????? ???  ??????? ???? ????? ??????? ???? ???? ???????????  · ????? ???? ??????? ?????? ?????? ?????????? (??????? ????? ????????? ??? ?????????)?  ? ?????? ?????????????? ?????? ?????? ???? ????????? ??????????????-- ?????? ??????? ????? ???????--???? ???? ??? ??????? ???????? ????? ???? ???? ?????? ?????? ?????? ?????????????? ????????, ???????????, ??? ? ????? ?????? ?? ?????, ????, ??????????? ????? ??? ? ??? ? ?????? ??????????? ????? ???????? ??? ???????? ??? ??????  ? ??????? ???????? ??? ???????????? ??? ??????? 30 ????? ??????? ????? ?????? ??????????? ??????? ?????? ????? ??? ??????? ??????, ???? ??????, ????? ?????? ?? ????? ?????? ?? ?????? ?????? ????? ???? ????????????? ???? ??? ??????????? ??????? ???????? ??? ? ?????????? ???, ??????? ??????? 2 ??? ???????? ????? ?????? ?????????? ??????????  ? ????? ??????? ??? ??????? ?????????? ????????? ???????? ????? ?????? ?????? ??????? ? ????? ????? ??? ????? ????????? ??? ?????????? ??????? ???????? ???????? ????? ????? ????????? ?? ???????? ??????? ???????????  · ??????? ???????? ??????? ??????? ????? ???? ???? ???? ?????????? ???????? ???? ???? ??? ?????????? ??????? ????? ???????????? ?????, ????? ????? ???? ????? ??????? ????? ??????????, ???? ?? ?????????? ???????? ????????? ???? ??? ??????????? ?????? ??? ????? ????????? ???????????  · ??????? ???????? ??? ???????? ?????? ???? ???? ????? ????? ????? ????????? ???????? ?????????? ?????????? ??????? ??????????? ????????? ??????? ?? ?????? ???? ?? ???? ????? ????????? ?? ??? ?????? ?????????? ?????? ??? ???? ????, ?????? ???? ?????? ????????? ????????  · ???????? ??????????? ??? ???????? ??????? ????? ???????, ????? ????????? ????????-?????? ????????? ? ????????? ?????? ???? ?????????? ????? ??????? ???????? ???? ???????? ???? ????? ???????  · ????? ??????????? ? ???? ??? ??????? ?????? ??????????? ???????? ????? ??????????? ??????? ???? ?? ?? ???? ??????? ???? ???? ?????? ???????? ???????? ???????? ?????? ?????????? ???? ?????????? ????? ????? ????????? ???????? ???? ???? ??????????? ?? ???????? ???????????  ??????? ??????? ???? ????? ??? ??????????  ???????? ???? ??? ??? ??????? ?????? ?????? ???? ????? ?????? 911 ?? ?? ?????????? ???????? ????, ????? ???????? ?? ?????????:  · ????? ????? ???????? (????? ??????) ?? ????? ????? ????? ??????? ???? ?? ????? ????????? (???????? ???? ????? ???? ???? ??? ?????)  ????? ????????? ????? ?? ????????? ?? ???????? ???????? ??????? ??????????, ???:  · ??????? ???? ???? 300 mg/dL ? ?? ??????? ???????? ??????? ???? ??? ????????? ???? ????? ???? ??  · ???????? ????? ???? ?????? ???????? ???, ?????:  ? ????? ?????  ? ????, ?????? ? ????? ???? ????? ??????  ? ???????? ??? ?????? ? ????? ??????? ?????  ? ????? ?????? ? ????? ???????  ? ????? ???????  ? ???????  ??????? ??????????? ???? ????????????? ???? ???????? ??????? ????????? ? ??? ????? ???? ????? ????????? ??????? ???? ??????? ?????????:  · ???????? ????? ???? ???? ????????? ???? ?????? ??????  · ???????? ?????????? ???????? ???????? ???????? ???, ?????:  ? ???? ???????? ????????????  ? ??????? ??? ?? ???????? ???? ?????, ????????????? ?? ???????? ?????  ? ???? ???????? ??????????  ??????? ?? ???? ????? ???????????  ???? ????????   [email or web address]  ???????? ????????? C553 ?????? ?? ????? ??? ?????? \"?????? 2 ??????: ??????? ???????????.\"  ?? ????? ???: 2019 12 20               ????????? ?????: 12.6  © 6444-3000 Healthwise, Incorporated. ???????? ?????????????? ?????? ???????? ???????? ??????? ????????? ?????? ????? ??????? ??? ???????? ?? ???????? ?????? ?? ?? ?????????? ?????? ????????? ??? ???, ???? ????? ????????? ?????? ????? ????????? ??????????? Healthwise, Incorporated, ?? ??????? ?? ????????? ???????? ???? ???? ??? ???????? ?? ?????????? ???????? ?????? Patient Education        ???? ???????????: ??????? ???????????   High Cholesterol: Care Instructions  ??????? ??????? ???????????     ??????????? ????? ??????? ????? ???? ???????? ?????? ??? ?? ???? ?????? ????????????? ???? ?????? ???? ????? ???? ?????? ?????? ??????????? ??????? ?????????? ??????? ??? ?? ??????? ???????? ??????? ??? ????? ???? ??????????? ??????? ??????? ????? ???????? ?????? ???? ??? ???? ??????? ??????? ? ?????? ????? ?????? ?????  LDL ?  HDL ??????? ????? ????????????? ??? ????? LDL \"????\" ??????????? ??? ???? LDL ?? ??????? ???? ???, ???????? ? ?????? ????? ?????? ???? ????? HDL \"??????\" ??????????? ??? ???? ??????? ??????? ???? ?????????????? ??? ???? ????? ?????? ???? LDL ?? ???? ???, ???????? ? ?????? ?? ???????? ????? ??????  ??????? ??????????? ????????? ??????? ????????? ???? ??? ?? ?????? ???? ??????? ???????? ???? ????? ????? ?????? ????? ? ??????? ???????? ??????? ???????? ?? ???? ?????? ? ?? ??? ? ??????? ???? ?? ????? ?????? ????? ????? ?????? ???????? ???? ???????????  ????????????? ???? ?????? ?????? ?????????? ??????? ??????????? ? ??????? ???????? ?? ???? ????? ?????? ??????? ????? ?? ???? ??????? ???? ????????? ??????? ???????? ? ?????? ???? ??????? ???? ????? ??? ? ????? ?????? ?????? ?????? ?? ??????? ??????? ?????? ?????? ????? ????? ????????? ? ????? ?????? ??? ?????? ??????  ???-?? ?????? ??????? ????? ??? ????????? ????? ??? ??? ??? ????????????? ??? ??? ????? ??????? ????????? ???? ??? ???????? ????????? ??????? ? ??? ????? ????????? ?? ?????????? ????? ?????? ????????? ???? ????? ??? ????? ???? ????????? ???? ?????? ??? ?? ?????? ????? ???  ??????? ???? ????? ??????? ???? ???? ???????????  · ???? ??? ??????? ???????? ??????? ????????? ?????? ??????? ?????? ?????, ????????, ???????? ??????? (?? ???? ?????), ????? ????? ??? ???, ?? ? ????????, ???? ?????????? (????? ????) ??? ??????? ?? ??-??? ???? ????? ?????????? ?????? ????  · ?????, ?????????? ? ?????????????? ?? ????? ????? ????????????? ????? ????? ????? ? ???????? ??? ????????? (?????????? ???? ???????? ??? ??????? ??????)  · ???? ?????? ????? ????, ???????? ???? ? ???? ??????? (????? ????) ?????? ??????????  · ??????????????? ??????? ???? ? ??????? ?????? ???????? ????? ?????, ?????????? ? ???????? ????? ??????????  · ?????, ?????? ????? ?? ????? ?????? ???????? ?????????? ????? ????????????  · ??????? ????? ?????? ????????? ??????? ???????? ????? ??????? 30 ??????? ??????? ??????????? ??????? ?????? ????? ??? ??????? ??????, ???? ??????, ????? ?????? ?? ????? ?????? ?? ?????? ?????? ????? ???? ????????????? ???? ??? ???????????  · ?????? ????? ???????? ?? ???? ???????? ?????? ??? ??????? ???????????? ???????? ????? ??? ??????????? ????? ?????? ???? 5 ???? 10 ?????????? ??????? ???, ??????? ???? ???? ?? ????????? ????? ?? ??????  · ???????? ???????????  ??????? ??????? ???? ????? ??? ??????????  ??????? ??????????? ???? ????????????? ???? ???????? ??????? ????????? ? ??? ????? ???? ????? ????????? ??????? ???? ??????? ?????????:  · ???????? ???????? ???????? ??????? ??????? ???????? ?????  · ???????? ????? ???? ???????? ??????????? ????  ??????? ?? ???? ????? ???????????  ???? ????????   [email or web address]  ???????? ????????? Aisha ?????? ?? ????? ??? ?????? \"???? ???????????: ??????? ???????????.\"  ?? ????? ???: 2019 12 16               ????????? ?????: 12.6  © [id number] Healthwise, Incorporated. ???????? ?????????????? ?????? ???????? ???????? ??????? ????????? ?????? ????? ??????? ??? ???????? ?? ???????? ?????? ?? ?? ?????????? ?????? ????????? ??? ???, ???? ????? ????????? ?????? ????? ????????? ??????????? Healthwise, Incorporated, ?? ??????? ?? ????????? ???????? ???? ???? ??? ???????? ?? ?????????? ???????? ?????? Patient Education        ???? ??????????? ???? ?????? Learning About High Cholesterol  ? ??? ??????????? ????? ?? ???     ??????????? ????? ??????? ????? ???? ???????? ?????? ??? ?? ???? ?????? ????????????? ???? ?????? ???? ????? ???? ?????? ?????? ??????????? ??????? ?????????? ??????? ??? ?? ??????? ???????? ??????? ??? ?????  ???????? ???????? ??????????? ? ??? ???? ??????? ????????? ????? ??? ???? ?????? ????? ???????????? ??????, ?? ???????????????? ??????? ???? ????????????? ??????? ??????? ? ?????? ????? ?????? ??????  ????????????? ??????? ????????? ???? LDL \"????\" ??????????? ??? ???? LDL ?? ??????? ???? ???, ???????? ? ?????? ????? ?????? ???? ????? HDL \"??????\" ??????????? ??? ???? HDL ???? ???, ???????? ? ?????? ???? ?? ???????? ????? ?????? ??  ??????? ??????????? ????????? ??????? ????????? ???? ??? ?? ?????? ???? ??????? ???????? ???? ????? ????? ??????  ????? ???? ????????????? ?????? ???? ???? ???????????  ???? ?????? ???? ???? ?????? ???????? ???? ?????????? ????? ????? ????????? ????? ?? ?????????? ??????? ? ?????????? ???? ??????? ????? ?? ?????  · ??????? ??????-?????? ??????? ?????????  ? ?????, ????????, ????? ???? (????? ?????), ?????? ??????? ? ???, ???? ? ????????, ???? ?????????? (????? ????) ? ????-???? ?? ??-???? ?? ????? ?????????? ?????????  ? ?????, ?????????? ? ?????????????? ?? ????? ????? ????????????? ????? ????? ????? ? ???????? ??? ????????? (?????????? ???? ???????? ??? ??????? ??????)  ? ???? ?????? ????? ????, ???????? ???? ? ???? ??????? (????? ????) ?????? ??????????  ? ??????????????? ??????? ???? ? ??????? ?????? ???????? ????? ?????, ?????????? ? ???????? ????? ??????????  · ?????? ????????? ????????? ??????? ??????????? ?????? ????? ?????? ????? ??????? ???????? ????? ??????? 30 ??????? ??????? ??????????? ??????? ?????? ????? ??? ??????? ??????, ???? ??????, ????? ?????? ?? ????? ?????? ?? ?????? ?????? ????? ???? ????????????? ???? ??? ???????????  · ?? ?????? ????? ????????? ???????? ?????? ? ??? ??? ???????????  · ???????? ?????????? ??? ???????? ??????? ????? ???????, ????? ????????? ????????-?????? ????????? ? ????????? ?????? ???? ?????????? ????? ??????? ???????? ???? ???????? ???? ????? ???????  ???? ??????????? ???? ????? ???????  ??????? ?????? ????? ???????? ??????? ?? ??????? ???? ??????????? ?? ?????? ??? ?????? ????? ??????? ????????????? ????????????? ?? ?????? ????? ?????  · ??????? ?????? ??????????? ????, ???????? ??????, ??? ??????? ? ?? ?????? ???? ????? ???????? ??????????? ????? ???????????  · ??????? ???? ???? ?????????? ????????  ???-?? ?????? ??????? ????? ??? ????????? ????? ??? ??? ??? ????????????? ??? ??? ????? ??????? ????????? ???? ??? ???????? ????????? ??????? ? ??? ????? ????????? ?? ?????????? ????? ?????? ????????? ???? ????? ??? ????? ???? ????????? ???? ?????? ??? ?? ?????? ????? ???  ??????? ?? ???? ????? ???????????  ???? ????????   [email or web address]  ???????? ????????? Q621 ?????? ?? ????? ??? ?????? \"???? ??????????? ???? ??????.\"  ?? ????? ???: 2019 12 16               ????????? ?????: 12.6  © [id number] [employer]. ???????? ?????????????? ?????? ???????? ???????? ??????? ????????? ?????? ????? ??????? ??? ???????? ?? ???????? ?????? ?? ?? ?????????? ?????? ????????? ??? ???, ???? ????? ????????? ?????? ????? ????????? ???????????  [employer], ?? ??????? ?? ????????? ???????? ???? ???? ??? ???????? ?? ?????????? ???????? ??????

## 2021-04-08 ENCOUNTER — OFFICE VISIT (OUTPATIENT)
Dept: INTERNAL MEDICINE CLINIC | Age: 41
End: 2021-04-08

## 2021-04-08 VITALS
HEART RATE: 71 BPM | BODY MASS INDEX: 27.5 KG/M2 | TEMPERATURE: 97.7 F | SYSTOLIC BLOOD PRESSURE: 128 MMHG | DIASTOLIC BLOOD PRESSURE: 72 MMHG | WEIGHT: 154 LBS | OXYGEN SATURATION: 98 %

## 2021-04-08 DIAGNOSIS — E78.5 DYSLIPIDEMIA: ICD-10-CM

## 2021-04-08 DIAGNOSIS — E11.9 TYPE 2 DIABETES MELLITUS WITHOUT COMPLICATION, WITHOUT LONG-TERM CURRENT USE OF INSULIN (HCC): ICD-10-CM

## 2021-04-08 DIAGNOSIS — N61.0 MASTITIS IN FEMALE: Primary | ICD-10-CM

## 2021-04-08 DIAGNOSIS — R51.9 INTRACTABLE HEADACHE, UNSPECIFIED CHRONICITY PATTERN, UNSPECIFIED HEADACHE TYPE: ICD-10-CM

## 2021-04-08 LAB
CHP ED QC CHECK: NORMAL
GLUCOSE BLD-MCNC: 98 MG/DL
HBA1C MFR BLD: 6.3 %

## 2021-04-08 PROCEDURE — 83036 HEMOGLOBIN GLYCOSYLATED A1C: CPT | Performed by: NURSE PRACTITIONER

## 2021-04-08 PROCEDURE — 99213 OFFICE O/P EST LOW 20 MIN: CPT | Performed by: NURSE PRACTITIONER

## 2021-04-08 PROCEDURE — 82962 GLUCOSE BLOOD TEST: CPT | Performed by: NURSE PRACTITIONER

## 2021-04-08 RX ORDER — IBUPROFEN 800 MG/1
800 TABLET ORAL EVERY 8 HOURS PRN
Qty: 90 TABLET | Refills: 3 | Status: SHIPPED | OUTPATIENT
Start: 2021-04-08 | End: 2021-07-12 | Stop reason: SDUPTHER

## 2021-04-08 RX ORDER — ATORVASTATIN CALCIUM 10 MG/1
10 TABLET, FILM COATED ORAL DAILY
Qty: 90 TABLET | Refills: 3 | Status: SHIPPED | OUTPATIENT
Start: 2021-04-08 | End: 2021-07-12 | Stop reason: SDUPTHER

## 2021-04-08 ASSESSMENT — ENCOUNTER SYMPTOMS
RESPIRATORY NEGATIVE: 1
GASTROINTESTINAL NEGATIVE: 1
EYES NEGATIVE: 1
ALLERGIC/IMMUNOLOGIC NEGATIVE: 1

## 2021-04-08 ASSESSMENT — PATIENT HEALTH QUESTIONNAIRE - PHQ9
1. LITTLE INTEREST OR PLEASURE IN DOING THINGS: 0
SUM OF ALL RESPONSES TO PHQ9 QUESTIONS 1 & 2: 0
SUM OF ALL RESPONSES TO PHQ QUESTIONS 1-9: 0

## 2021-04-08 NOTE — PATIENT INSTRUCTIONS
Exercise 3 times a week, do not stop  Be observant of portion sizes  Warm compress to chest for 15 minutes three times a day  Ibuprofen with food three times a day for 7 days  Make sure bra fits snugly

## 2021-04-08 NOTE — PROGRESS NOTES
Subjective:      Patient ID: Henrique Woodard is a 36 y.o. female. Diabetes  She presents for her follow-up diabetic visit. She has type 2 diabetes mellitus. The initial diagnosis of diabetes was made 4 years ago. Her disease course has been fluctuating. Hypoglycemia symptoms include nervousness/anxiousness. Associated symptoms include chest pain. There are no hypoglycemic complications. Symptoms are stable. Her weight is stable. She is following a generally healthy diet. She participates in exercise three times a week. An ACE inhibitor/angiotensin II receptor blocker is not being taken. She does not see a podiatrist.Eye exam is not current. Chest Pain   This is a new problem. The current episode started in the past 7 days. The problem occurs constantly. The pain is at a severity of 4/10. The pain is moderate. The quality of the pain is described as sharp. The pain is aggravated by deep breathing. She has tried nothing for the symptoms. Review of Systems   Constitutional: Negative. HENT: Negative. Eyes: Negative. Respiratory: Negative. Cardiovascular: Positive for chest pain. Gastrointestinal: Negative. Endocrine: Negative. Genitourinary: Negative. Musculoskeletal: Negative. Skin: Negative. Allergic/Immunologic: Negative. Neurological: Negative. Hematological: Negative. Psychiatric/Behavioral: The patient is nervous/anxious.       Vitals:    04/08/21 1246   BP: 128/72   Pulse: 71   Temp: 97.7 °F (36.5 °C)   SpO2: 98%     Wt Readings from Last 3 Encounters:   04/08/21 154 lb (69.9 kg)   01/07/21 159 lb 9.6 oz (72.4 kg)   09/08/20 165 lb (74.8 kg)     BP Readings from Last 3 Encounters:   04/08/21 128/72   01/07/21 134/74   09/08/20 116/80     Past Medical History:   Diagnosis Date    Acute seasonal allergic rhinitis 2/20/2018    GERD (gastroesophageal reflux disease)     Intractable headache 2/20/2018    Knee pain, chronic     Type 2 diabetes mellitus without complication, without long-term current use of insulin ([facility] [address] 2/20/2018     Objective:   Physical Exam  Constitutional:       Appearance: Normal appearance. She is normal weight. Cardiovascular:      Rate and Rhythm: Normal rate and regular rhythm. Chest:      Chest wall: Tenderness present. Neurological:      Mental Status: She is alert.          Assessment:      Type 2 Diabetes: stable  Mastitis: chest pain      Plan:    Diabetes:    Exercise 3 times a week, do not stop  Be observant of portion sizes    Mastitis    Warm compress to chest for 15 minutes three times a day  Ibuprofen with food three times a day for 7 days  Make sure bra fits snugly        Josse Solis, APRN - CNP

## 2021-07-12 ENCOUNTER — OFFICE VISIT (OUTPATIENT)
Dept: INTERNAL MEDICINE CLINIC | Age: 41
End: 2021-07-12

## 2021-07-12 VITALS
TEMPERATURE: 97.3 F | BODY MASS INDEX: 26.43 KG/M2 | WEIGHT: 148 LBS | DIASTOLIC BLOOD PRESSURE: 78 MMHG | SYSTOLIC BLOOD PRESSURE: 106 MMHG | OXYGEN SATURATION: 99 % | HEART RATE: 70 BPM

## 2021-07-12 DIAGNOSIS — E11.9 TYPE 2 DIABETES MELLITUS WITHOUT COMPLICATION, WITHOUT LONG-TERM CURRENT USE OF INSULIN (HCC): ICD-10-CM

## 2021-07-12 DIAGNOSIS — L30.9 ECZEMA, UNSPECIFIED TYPE: Primary | ICD-10-CM

## 2021-07-12 DIAGNOSIS — R51.9 INTRACTABLE HEADACHE, UNSPECIFIED CHRONICITY PATTERN, UNSPECIFIED HEADACHE TYPE: ICD-10-CM

## 2021-07-12 DIAGNOSIS — E78.5 DYSLIPIDEMIA: ICD-10-CM

## 2021-07-12 LAB
CHP ED QC CHECK: NORMAL
GLUCOSE BLD-MCNC: 99 MG/DL

## 2021-07-12 PROCEDURE — 82962 GLUCOSE BLOOD TEST: CPT | Performed by: NURSE PRACTITIONER

## 2021-07-12 PROCEDURE — 99213 OFFICE O/P EST LOW 20 MIN: CPT | Performed by: NURSE PRACTITIONER

## 2021-07-12 RX ORDER — ATORVASTATIN CALCIUM 10 MG/1
10 TABLET, FILM COATED ORAL DAILY
Qty: 90 TABLET | Refills: 3 | Status: SHIPPED | OUTPATIENT
Start: 2021-07-12 | End: 2022-01-12 | Stop reason: SDUPTHER

## 2021-07-12 RX ORDER — CLOBETASOL PROPIONATE 0.5 MG/G
OINTMENT TOPICAL
Qty: 30 G | Refills: 1 | Status: SHIPPED | OUTPATIENT
Start: 2021-07-12

## 2021-07-12 RX ORDER — IBUPROFEN 800 MG/1
800 TABLET ORAL EVERY 8 HOURS PRN
Qty: 90 TABLET | Refills: 3 | Status: SHIPPED | OUTPATIENT
Start: 2021-07-12 | End: 2022-01-12 | Stop reason: SDUPTHER

## 2021-07-12 RX ORDER — ASPIRIN 81 MG/1
81 TABLET ORAL DAILY
Qty: 90 TABLET | Refills: 3 | Status: SHIPPED | OUTPATIENT
Start: 2021-07-12

## 2021-07-12 RX ORDER — OMEPRAZOLE 40 MG/1
40 CAPSULE, DELAYED RELEASE ORAL DAILY
Qty: 90 CAPSULE | Refills: 3 | Status: SHIPPED | OUTPATIENT
Start: 2021-07-12 | End: 2022-01-12 | Stop reason: SDUPTHER

## 2021-07-12 ASSESSMENT — PATIENT HEALTH QUESTIONNAIRE - PHQ9
1. LITTLE INTEREST OR PLEASURE IN DOING THINGS: 0
2. FEELING DOWN, DEPRESSED OR HOPELESS: 0
SUM OF ALL RESPONSES TO PHQ QUESTIONS 1-9: 0
SUM OF ALL RESPONSES TO PHQ QUESTIONS 1-9: 0
SUM OF ALL RESPONSES TO PHQ9 QUESTIONS 1 & 2: 0
SUM OF ALL RESPONSES TO PHQ QUESTIONS 1-9: 0

## 2021-07-12 NOTE — PATIENT INSTRUCTIONS
Keep skin moist at all times, use aquaphor to keep skin moist between applications  Continue to drink plenty of water  Continue to exercise

## 2021-07-12 NOTE — PROGRESS NOTES
Kiera Beebe (:  1980) is a 39 y.o. female,Established patient, here for evaluation of the following chief complaint(s):  Diabetes (f/u) and Rash (both leg from month)         ASSESSMENT/PLAN:  1. Dyslipidemia  -     aspirin EC 81 MG EC tablet; Take 1 tablet by mouth daily, Disp-90 tablet, R-3Normal  -     atorvastatin (LIPITOR) 10 MG tablet; Take 1 tablet by mouth daily, Disp-90 tablet, R-3Normal  2. Type 2 diabetes mellitus without complication, without long-term current use of insulin (HCC)  -     atorvastatin (LIPITOR) 10 MG tablet; Take 1 tablet by mouth daily, Disp-90 tablet, R-3Normal  -     metFORMIN (GLUCOPHAGE) 1000 MG tablet; Take 1 tablet by mouth 2 times daily (with meals), Disp-180 tablet, R-1Normal  -     blood glucose test strips (ASCENSIA AUTODISC VI;ONE TOUCH ULTRA TEST VI) strip; DAILY Starting Mon 2021, Disp-100 each, R-3, NormalAs needed. -     POCT Glucose  3. Intractable headache, unspecified chronicity pattern, unspecified headache type  -     ibuprofen (ADVIL;MOTRIN) 800 MG tablet; Take 1 tablet by mouth every 8 hours as needed for Pain, Disp-90 tablet, R-3Normal      No follow-ups on file. Subjective   SUBJECTIVE/OBJECTIVE:  Rash  This is a recurrent problem. The problem has been waxing and waning since onset. The affected locations include the left arm, left lower leg, right lower leg and right arm. The rash is characterized by dryness, itchiness and redness. She was exposed to nothing. Past treatments include topical steroids. The treatment provided moderate relief. Her past medical history is significant for eczema. Presents today with follow up on diabetes and rash on lower legs, arms and upper chest    Review of Systems   Skin: Positive for rash. Objective   Physical Exam  Constitutional:       Appearance: Normal appearance. She is normal weight. Cardiovascular:      Rate and Rhythm: Normal rate and regular rhythm.    Pulmonary:      Effort: Pulmonary effort is normal.      Breath sounds: Normal breath sounds. Skin:     Findings: Rash present. Rash is scaling and urticarial.             Comments: Hyperpigmented areas noted, with dry scaly areas that are puiritic   Neurological:      Mental Status: She is alert. Psychiatric:         Attention and Perception: Attention and perception normal.                  An electronic signature was used to authenticate this note.     --Sisi Rhodes, APRN - CNP

## 2022-01-12 ENCOUNTER — OFFICE VISIT (OUTPATIENT)
Dept: INTERNAL MEDICINE CLINIC | Age: 42
End: 2022-01-12
Payer: COMMERCIAL

## 2022-01-12 VITALS
HEIGHT: 63 IN | BODY MASS INDEX: 26.9 KG/M2 | OXYGEN SATURATION: 99 % | WEIGHT: 151.8 LBS | HEART RATE: 73 BPM | DIASTOLIC BLOOD PRESSURE: 78 MMHG | TEMPERATURE: 97.2 F | SYSTOLIC BLOOD PRESSURE: 122 MMHG

## 2022-01-12 DIAGNOSIS — E11.9 TYPE 2 DIABETES MELLITUS WITHOUT COMPLICATION, WITHOUT LONG-TERM CURRENT USE OF INSULIN (HCC): Primary | ICD-10-CM

## 2022-01-12 DIAGNOSIS — E78.5 DYSLIPIDEMIA: ICD-10-CM

## 2022-01-12 DIAGNOSIS — R74.01 ELEVATED AST (SGOT): ICD-10-CM

## 2022-01-12 DIAGNOSIS — R51.9 INTRACTABLE HEADACHE, UNSPECIFIED CHRONICITY PATTERN, UNSPECIFIED HEADACHE TYPE: ICD-10-CM

## 2022-01-12 LAB
CREATININE URINE: 125.6 MG/DL (ref 28–259)
MICROALBUMIN UR-MCNC: <1.2 MG/DL
MICROALBUMIN/CREAT UR-RTO: NORMAL MG/G (ref 0–30)

## 2022-01-12 PROCEDURE — 1036F TOBACCO NON-USER: CPT | Performed by: NURSE PRACTITIONER

## 2022-01-12 PROCEDURE — 99214 OFFICE O/P EST MOD 30 MIN: CPT | Performed by: NURSE PRACTITIONER

## 2022-01-12 PROCEDURE — 3046F HEMOGLOBIN A1C LEVEL >9.0%: CPT | Performed by: NURSE PRACTITIONER

## 2022-01-12 PROCEDURE — G8484 FLU IMMUNIZE NO ADMIN: HCPCS | Performed by: NURSE PRACTITIONER

## 2022-01-12 PROCEDURE — 2022F DILAT RTA XM EVC RTNOPTHY: CPT | Performed by: NURSE PRACTITIONER

## 2022-01-12 PROCEDURE — G8419 CALC BMI OUT NRM PARAM NOF/U: HCPCS | Performed by: NURSE PRACTITIONER

## 2022-01-12 PROCEDURE — G8427 DOCREV CUR MEDS BY ELIG CLIN: HCPCS | Performed by: NURSE PRACTITIONER

## 2022-01-12 RX ORDER — ATORVASTATIN CALCIUM 10 MG/1
10 TABLET, FILM COATED ORAL DAILY
Qty: 90 TABLET | Refills: 3 | Status: SHIPPED | OUTPATIENT
Start: 2022-01-12

## 2022-01-12 RX ORDER — OMEPRAZOLE 40 MG/1
40 CAPSULE, DELAYED RELEASE ORAL DAILY
Qty: 90 CAPSULE | Refills: 3 | Status: SHIPPED | OUTPATIENT
Start: 2022-01-12 | End: 2022-07-12 | Stop reason: SDUPTHER

## 2022-01-12 RX ORDER — IBUPROFEN 800 MG/1
800 TABLET ORAL EVERY 8 HOURS PRN
Qty: 90 TABLET | Refills: 3 | Status: SHIPPED | OUTPATIENT
Start: 2022-01-12

## 2022-01-12 SDOH — ECONOMIC STABILITY: FOOD INSECURITY: WITHIN THE PAST 12 MONTHS, YOU WORRIED THAT YOUR FOOD WOULD RUN OUT BEFORE YOU GOT MONEY TO BUY MORE.: NEVER TRUE

## 2022-01-12 SDOH — ECONOMIC STABILITY: FOOD INSECURITY: WITHIN THE PAST 12 MONTHS, THE FOOD YOU BOUGHT JUST DIDN'T LAST AND YOU DIDN'T HAVE MONEY TO GET MORE.: NEVER TRUE

## 2022-01-12 ASSESSMENT — ENCOUNTER SYMPTOMS
ALLERGIC/IMMUNOLOGIC NEGATIVE: 1
GASTROINTESTINAL NEGATIVE: 1
RESPIRATORY NEGATIVE: 1
EYES NEGATIVE: 1

## 2022-01-12 ASSESSMENT — PATIENT HEALTH QUESTIONNAIRE - PHQ9
SUM OF ALL RESPONSES TO PHQ QUESTIONS 1-9: 0
2. FEELING DOWN, DEPRESSED OR HOPELESS: 0
SUM OF ALL RESPONSES TO PHQ QUESTIONS 1-9: 0
SUM OF ALL RESPONSES TO PHQ9 QUESTIONS 1 & 2: 0
1. LITTLE INTEREST OR PLEASURE IN DOING THINGS: 0

## 2022-01-12 ASSESSMENT — SOCIAL DETERMINANTS OF HEALTH (SDOH): HOW HARD IS IT FOR YOU TO PAY FOR THE VERY BASICS LIKE FOOD, HOUSING, MEDICAL CARE, AND HEATING?: NOT HARD AT ALL

## 2022-01-12 NOTE — PROGRESS NOTES
Breana Godinez (:  1980) is a 39 y.o. female,Established patient, here for evaluation of the following chief complaint(s):  Follow-up (needs meds refilled)         ASSESSMENT/PLAN:  1. Dyslipidemia  -     atorvastatin (LIPITOR) 10 MG tablet; Take 1 tablet by mouth daily, Disp-90 tablet, R-3Normal  -     Lipid, Fasting; Future  2. Type 2 diabetes mellitus without complication, without long-term current use of insulin (HCC)  -     atorvastatin (LIPITOR) 10 MG tablet; Take 1 tablet by mouth daily, Disp-90 tablet, R-3Normal  -     metFORMIN (GLUCOPHAGE) 1000 MG tablet; Take 1 tablet by mouth 2 times daily (with meals), Disp-180 tablet, R-1Normal  3. Intractable headache, unspecified chronicity pattern, unspecified headache type  -     ibuprofen (ADVIL;MOTRIN) 800 MG tablet; Take 1 tablet by mouth every 8 hours as needed for Pain, Disp-90 tablet, R-3Normal    Jessica Brady was seen today for follow-up. Diagnoses and all orders for this visit:    Type 2 diabetes mellitus without complication, without long-term current use of insulin (HCC)  -     atorvastatin (LIPITOR) 10 MG tablet; Take 1 tablet by mouth daily  -     metFORMIN (GLUCOPHAGE) 1000 MG tablet; Take 1 tablet by mouth 2 times daily (with meals)  -     MICROALBUMIN / CREATININE URINE RATIO    Dyslipidemia  -     atorvastatin (LIPITOR) 10 MG tablet; Take 1 tablet by mouth daily  -     Lipid, Fasting; Future    Intractable headache, unspecified chronicity pattern, unspecified headache type  -     ibuprofen (ADVIL;MOTRIN) 800 MG tablet; Take 1 tablet by mouth every 8 hours as needed for Pain    Elevated AST (SGOT)  -     HEPATIC FUNCTION PANEL; Future    Other orders  -     omeprazole (PRILOSEC) 40 MG delayed release capsule; Take 1 capsule by mouth daily      No follow-ups on file. Subjective   SUBJECTIVE/OBJECTIVE:  HPI Presents today for followo up on diabetes and hyperlipidemia    Review of Systems   Constitutional: Negative. HENT: Negative.

## 2022-07-12 ENCOUNTER — OFFICE VISIT (OUTPATIENT)
Dept: INTERNAL MEDICINE CLINIC | Age: 42
End: 2022-07-12
Payer: COMMERCIAL

## 2022-07-12 VITALS
HEART RATE: 75 BPM | BODY MASS INDEX: 21.61 KG/M2 | DIASTOLIC BLOOD PRESSURE: 62 MMHG | SYSTOLIC BLOOD PRESSURE: 108 MMHG | OXYGEN SATURATION: 98 % | WEIGHT: 121 LBS

## 2022-07-12 DIAGNOSIS — K21.00 GASTROESOPHAGEAL REFLUX DISEASE WITH ESOPHAGITIS WITHOUT HEMORRHAGE: ICD-10-CM

## 2022-07-12 DIAGNOSIS — Z00.00 ENCOUNTER FOR WELL ADULT EXAM WITHOUT ABNORMAL FINDINGS: Primary | ICD-10-CM

## 2022-07-12 DIAGNOSIS — E11.9 TYPE 2 DIABETES MELLITUS WITHOUT COMPLICATION, WITHOUT LONG-TERM CURRENT USE OF INSULIN (HCC): ICD-10-CM

## 2022-07-12 PROCEDURE — 99396 PREV VISIT EST AGE 40-64: CPT | Performed by: NURSE PRACTITIONER

## 2022-07-12 RX ORDER — OMEPRAZOLE 40 MG/1
40 CAPSULE, DELAYED RELEASE ORAL DAILY
Qty: 30 CAPSULE | Refills: 3 | Status: SHIPPED | OUTPATIENT
Start: 2022-07-12 | End: 2022-11-09

## 2022-07-12 ASSESSMENT — PATIENT HEALTH QUESTIONNAIRE - PHQ9
SUM OF ALL RESPONSES TO PHQ QUESTIONS 1-9: 0
2. FEELING DOWN, DEPRESSED OR HOPELESS: 0
SUM OF ALL RESPONSES TO PHQ QUESTIONS 1-9: 0
5. POOR APPETITE OR OVEREATING: 0
SUM OF ALL RESPONSES TO PHQ QUESTIONS 1-9: 0
SUM OF ALL RESPONSES TO PHQ9 QUESTIONS 1 & 2: 0
6. FEELING BAD ABOUT YOURSELF - OR THAT YOU ARE A FAILURE OR HAVE LET YOURSELF OR YOUR FAMILY DOWN: 0
10. IF YOU CHECKED OFF ANY PROBLEMS, HOW DIFFICULT HAVE THESE PROBLEMS MADE IT FOR YOU TO DO YOUR WORK, TAKE CARE OF THINGS AT HOME, OR GET ALONG WITH OTHER PEOPLE: 0
3. TROUBLE FALLING OR STAYING ASLEEP: 0
8. MOVING OR SPEAKING SO SLOWLY THAT OTHER PEOPLE COULD HAVE NOTICED. OR THE OPPOSITE, BEING SO FIGETY OR RESTLESS THAT YOU HAVE BEEN MOVING AROUND A LOT MORE THAN USUAL: 0
9. THOUGHTS THAT YOU WOULD BE BETTER OFF DEAD, OR OF HURTING YOURSELF: 0
4. FEELING TIRED OR HAVING LITTLE ENERGY: 0
SUM OF ALL RESPONSES TO PHQ QUESTIONS 1-9: 0
1. LITTLE INTEREST OR PLEASURE IN DOING THINGS: 0
7. TROUBLE CONCENTRATING ON THINGS, SUCH AS READING THE NEWSPAPER OR WATCHING TELEVISION: 0

## 2022-07-12 ASSESSMENT — ANXIETY QUESTIONNAIRES
GAD7 TOTAL SCORE: 0
IF YOU CHECKED OFF ANY PROBLEMS ON THIS QUESTIONNAIRE, HOW DIFFICULT HAVE THESE PROBLEMS MADE IT FOR YOU TO DO YOUR WORK, TAKE CARE OF THINGS AT HOME, OR GET ALONG WITH OTHER PEOPLE: NOT DIFFICULT AT ALL
2. NOT BEING ABLE TO STOP OR CONTROL WORRYING: 0
1. FEELING NERVOUS, ANXIOUS, OR ON EDGE: 0
5. BEING SO RESTLESS THAT IT IS HARD TO SIT STILL: 0
6. BECOMING EASILY ANNOYED OR IRRITABLE: 0
7. FEELING AFRAID AS IF SOMETHING AWFUL MIGHT HAPPEN: 0
3. WORRYING TOO MUCH ABOUT DIFFERENT THINGS: 0
4. TROUBLE RELAXING: 0

## 2022-07-12 ASSESSMENT — ENCOUNTER SYMPTOMS
RESPIRATORY NEGATIVE: 1
NAUSEA: 1
EYES NEGATIVE: 1
ALLERGIC/IMMUNOLOGIC NEGATIVE: 1
ABDOMINAL PAIN: 1

## 2022-07-12 NOTE — PATIENT INSTRUCTIONS
Prilosec only once a day  Stop metformin  Clean with baby wipes or water after each trip to bathroom  Increase to 4-5 bottle of water a day       Well Visit, Ages 25 to 48: Care Instructions  Overview     Well visits can help you stay healthy. Your doctor has checked your overall health and may have suggested ways to take good care of yourself. Your doctor also may have recommended tests. At home, you can help prevent illness withhealthy eating, regular exercise, and other steps. Follow-up care is a key part of your treatment and safety. Be sure to make and go to all appointments, and call your doctor if you are having problems. It's also a good idea to know your test results and keep alist of the medicines you take. How can you care for yourself at home?  Get screening tests that you and your doctor decide on. Screening helps find diseases before any symptoms appear.  Eat healthy foods. Choose fruits, vegetables, whole grains, protein, and low-fat dairy foods. Limit fat, especially saturated fat. Reduce salt in your diet.  Limit alcohol. If you are a man, have no more than 2 drinks a day or 14 drinks a week. If you are a woman, have no more than 1 drink a day or 7 drinks a week.  Get at least 30 minutes of physical activity on most days of the week. Walking is a good choice. You also may want to do other activities, such as running, swimming, cycling, or playing tennis or team sports. Discuss any changes in your exercise program with your doctor.  Reach and stay at a healthy weight. This will lower your risk for many problems, such as obesity, diabetes, heart disease, and high blood pressure.  Do not smoke or allow others to smoke around you. If you need help quitting, talk to your doctor about stop-smoking programs and medicines. These can increase your chances of quitting for good.  Care for your mental health. It is easy to get weighed down by worry and stress.  Learn strategies to manage stress, 2021               Content Version: 13.3  © 2006-2022 Healthwise, [employer]. Care instructions adapted under license by [facility] ([facility]). If you have questions about a medical condition or this instruction, always ask your healthcare professional. [address] any warranty or liability for your use of this information. Heart-Healthy Diet   Sodium, Fat, and Cholesterol Controlled Diet       What Is a Heart Healthy Diet? A heart-healthy diet is one that limits sodium , certain types of fat , and cholesterol . This type of diet is recommended for:   · People with any form of cardiovascular disease (eg, coronary heart disease , peripheral vascular disease , previous heart attack , previous stroke )   · People with risk factors for cardiovascular disease, such as high blood pressure , high cholesterol , or diabetes   · Anyone who wants to lower their risk of developing cardiovascular disease   Sodium    Sodium is a mineral found in many foods. In general, most people consume much more sodium than they need. Diets high in sodium can increase blood pressure and lead to edema (water retention). On a heart-healthy diet, you should consume no more than 2,300 mg (milligrams) of sodium per dayabout the amount in one teaspoon of table salt. The foods highest in sodium include table salt (about 50% sodium), processed foods, convenience foods, and preserved foods. Cholesterol    Cholesterol is a fat-like, waxy substance in your blood. Our bodies make some cholesterol. It is also found in animal products, with the highest amounts in fatty meat, egg yolks, whole milk, cheese, shellfish, and organ meats. On a heart-healthy diet, you should limit your cholesterol intake to less than 200 mg per day. It is normal and important to have some cholesterol in your bloodstream. But too much cholesterol can cause plaque to build up within your arteries, which can eventually lead to a heart attack or stroke. The two types of cholesterol that are most commonly referred to are:   · Low-density lipoprotein (LDL) cholesterol  Also known as bad cholesterol, this is the cholesterol that tends to build up along your arteries. Bad cholesterol levels are increased by eating fats that are saturated or hydrogenated. Optimal level of this cholesterol is less than 100. Over 130 starts to get risky for heart disease. · High-density lipoprotein (HDL) cholesterol  Also known as good cholesterol, this type of cholesterol actually carries cholesterol away from your arteries and may, therefore, help lower your risk of having a heart attack. You want this level to be high (ideally greater than 60). It is a risk to have a level less than 40. You can raise this good cholesterol by eating olive oil, canola oil, avocados, or nuts. Exercise raises this level, too. Fat    Fat is calorie dense and packs a lot of calories into a small amount of food. Even though fats should be limited due to their high calorie content, not all fats are bad. In fact, some fats are quite healthful. Fat can be broken down into four main types.    · The good-for-you fats are:   ¨ Monounsaturated fat  found in oils such as olive and canola, avocados, and nuts and natural nut butters; can decrease cholesterol levels, while keeping levels of HDL cholesterol high   ¨ Polyunsaturated fat  found in oils such as safflower, sunflower, soybean, corn, and sesame; can decrease total cholesterol and LDL cholesterol   ¨ Omega-3 fatty acids  particularly those found in fatty fish (such as salmon, trout, tuna, mackerel, herring, and sardines); can decrease risk of arrhythmias, decrease triglyceride levels, and slightly lower blood pressure   · The fats that you want to limit are:   ¨ Saturated fat  found in animal products, many fast foods, and a few vegetables; increases total blood cholesterol, including LDL levels   § Animal fats that are saturated include: butter, lard, whole-milk dairy products, meat fat, and poultry skin   § Vegetable fats that are saturated include: hydrogenated shortening, palm oil, coconut oil, cocoa butter   ¨ Hydrogenated or trans fat  found in margarine and vegetable shortening, most shelf stable snack foods, and fried foods; increases LDL and decreases HDL     It is generally recommended that you limit your total fat for the day to less than 30% of your total calories. If you follow an 1800-calorie heart healthy diet, for example, this would mean 60 grams of fat or less per day. Saturated fat and trans fat in your diet raises your blood cholesterol the most, much more than dietary cholesterol does. For this reason, on a heart-healthy diet, less than 7% of your calories should come from saturated fat and ideally 0% from trans fat. On an 1800-calorie diet, this translates into less than 14 grams of saturated fat per day, leaving 46 grams of fat to come from mono- and polyunsaturated fats.    Food Choices on a Heart Healthy Diet   Food Category   Foods Recommended   Foods to Avoid   Grains   Breads and rolls without salted tops Most dry and cooked cereals Unsalted crackers and breadsticks Low-sodium or homemade breadcrumbs or stuffing All rice and pastas   Breads, rolls, and crackers with salted tops High-fat baked goods (eg, muffins, donuts, pastries) Quick breads, self-rising flour, and biscuit mixes Regular bread crumbs Instant hot cereals Commercially prepared rice, pasta, or stuffing mixes   Vegetables   Most fresh, frozen, and low-sodium canned vegetables Low-sodium and salt-free vegetable juices Canned vegetables if unsalted or rinsed   Regular canned vegetables and juices, including sauerkraut and pickled vegetables Frozen vegetables with sauces Commercially prepared potato and vegetable mixes   Fruits   Most fresh, frozen, and canned fruits All fruit juices   Fruits processed with salt or sodium   Milk   Nonfat or low-fat (1%) milk Nonfat or low-fat per week; the fish highest in omega-3 fatty acids and lowest in mercury include salmon, herring, mackerel, sardines, and canned chunk light tuna. If you eat fish less than twice per week or have high triglycerides, talk to your doctor about taking fish oil supplements. · Read food labels. ¨ For products low in fat and cholesterol, look for fat free, low-fat, cholesterol free, saturated fat free, and trans fat freeAlso scan the Nutrition Facts Label, which lists saturated fat, trans fat, and cholesterol amounts. ¨ For products low in sodium, look for sodium free, very low sodium, low sodium, no added salt, and unsalted   · Skip the salt when cooking or at the table; if food needs more flavor, get creative and try out different herbs and spices. Garlic and onion also add substantial flavor to foods. · Trim any visible fat off meat and poultry before cooking, and drain the fat off after peralta. · Use cooking methods that require little or no added fat, such as grilling, boiling, baking, poaching, broiling, roasting, steaming, stir-frying, and sauting. · Avoid fast food and convenience food. They tend to be high in saturated and trans fat and have a lot of added salt. · Talk to a registered dietitian for individualized diet advice.       Last Reviewed: March 2011 Courtney Marte MS, MPH, RD   Updated: 3/29/2011     Patient Education        ????????????????? ???????? ??? (GERD): ??????? ???????????  Gastroesophageal Reflux Disease (GERD): Care Instructions  ??????? ??????? ???????????     ?????????????????? ???????? ????? (GERD) ???????? ???? ????? ?????? ?????????? ?????? ??? ?????? ????????? ????? ????? ?? ?? ??????? ???????? ??????? ?????? ??-????? ?????? ????? ??????? ?? ??????? ???????? ???? ??????? ??????? ????????GERD ?????, ?? ???? ???????? ???????? ????? ??? ???? ???????  ??? ???????? ????? GERD ???????? ???? ???? ?????? ????? ???, ????? ??????????? ?? ???? ????? ???? ???????? ????????? ????????? ???? ????? ??? ??????????? ??????? ?????? ???????? ???????, ??? ???????? ? ???? ?????????? ??????????????? ???????? ?? ?????? ??? ????? ?????????  ???-?? ?????? ??????? ????? ??? ????????? ????? ??? ??? ??? ????????????? ??? ??? ????? ??????? ????????? ???? ??? ???????? ????????? ??????? ? ??? ????? ????????? ?? ?????????? ????? ?????? ????????? ???? ???????? ????? ???? ????????? ???? ?????? ??? ?? ?????? ????? ???  ??????? ???? ????? ??????? ???? ???? ???????????   ????? ??????? ??? ??????? ?????????? ????????? ???????? ????? ?????? ?????? ??????? ? ????? ????? ??? ????? ????????? ??? ??????????   ??????? ???????? ???????? ???? ??????? ???? ????? ???? ??????? ???? ??????????? ????? ?? ?????????? ???? ????? ????, ??, ????????, ???????? ?? ???????????? ??????????? ????????? ??? ???????? ??????? ???????? ??????? ????? ????? ???? ?? ????? ??????? ????? ??????? ???, ????????? ????, ??????? 75 ?? ????????? ???? ??????? ???? ??????? ?????? ??? ??????????? ????????? ? ???? ?????????? ????? ?? ??????? ????? ?????? ?????????? ???, ????? ????????? ???????? ??????????   ????? ???? ??????? ???????????  ? ??? ?? ?????? ???? ???????? ???? ???? ????? ??????? ???? ???? ???? ???? ????? ???  ? ??????? ???? ??????, ??????? ???? 2 ???? 3 ????????? ???????????  ? ?????, ????? ? ?????? GERD ?? ??? ?????  ? ???????? ???????, ???? ???? ???? ???????? (????? ???????? ? ??????????) ? ????? ???? ?????????? GERD ???????? ?? ???? ????? ??????? ???? ????? ???? ?????? ??????? ???????? ?? ??????? ????, ?? ??????????? ???? ??????????? ????? ???? ???? ??? ??????? ???????????   ???????? ?????????? ?? ??????? ???????????? ?????????? GERD ?? ??????? ?????? ??? ???????? ??????? ????? ???????, ????? ????????? ????????-?????? ????????? ? ????????? ?????? ???? ?????????? ????? ??????? ???????? ???? ???????? ???? ????? ???????   ??????? GERD ???????? ???? ?? ???? ?????? ???, ??????? ???????? ???????? ????? ?????? ????????? ????? ?? ????? ????????? ????????? ??? ??? ????? 6 ???? 8 ???????? ??????????? (????????? ??????? ??? ???????)   ??????? ???? ????? ????? ???? ????????????   ???????? ?????? ? ??? ??? ??????????? 5 ???? 10 ?????? ??????? ????? ????? ?????  ??????? ??????? ???? ????? ??? ??????????  ????? ????????? ????? ?? ????????? ?? ???????? ???????? ??????? ??????????, ???:   ???????? ???? ?? ??? ????? ??? ?????? ??   ??????? ???? ???? ? ??? ????? ??????? ??? ?? ????????? ????? ??????? ??? ????  ??????? ??????????? ???? ????????????? ???? ???????? ??????? ????????? ? ???????? ???? ????? ????????? ??????? ???? ??????? ?????????:   ??????? ???????? 2 ?????? ????? ??????   ???? ??????? ????? ?? ?????? ???????? ????? ???????  ??????? ?? ???? ????? ???????????  ???? ????????   [email or web address]  ???????? ????????? [id number] ?????? ?? ????? ??? ?????? \"????????????????? ???????? ??? (GERD): ??????? ???????????.\"  ?? ????? ???: 2021 09 08               ????????? ?????: 13.3  © 9833-4150 [employer]. ???????? ?????????????? ?????? ???????? ???????? ??????? ????????? ?????? ????? ??????? ??? ???????? ?? ???????? ?????? ?? ?? ?????????? ?????? ????????? ??? ???, ???? ????? ????????? ?????? ????? ????????? ???????????  [employer], ?? ??????? ?? ????????? ???????? ???? ???? ??? ???????? ???????????? ???????? ??????

## 2022-07-12 NOTE — PROGRESS NOTES
Well Adult Note  Name: Sandhya Olvera Date: 2022   MRN: [id number] Sex: Female   Age: 43 y.o. Ethnicity: Non- / Non    : 1980 Race: Other      Nani Melendez is here for well adult exam.  History:  Diabetes  Headaches      Review of Systems   Constitutional: Negative. HENT: Negative. Eyes: Negative. Respiratory: Negative. Cardiovascular: Negative. Gastrointestinal: Positive for abdominal pain and nausea. Endocrine: Negative. Genitourinary: Negative. Allergic/Immunologic: Negative. Neurological: Negative. Vitals:    22 1212   BP: 108/62   Pulse: 75   SpO2: 98%     BP Readings from Last 3 Encounters:   22 108/62   22 122/78   21 106/78     Wt Readings from Last 3 Encounters:   22 121 lb (54.9 kg)   22 151 lb 12.8 oz (68.9 kg)   21 148 lb (67.1 kg)     No Known Allergies    Prior to Visit Medications    Medication Sig Taking? Authorizing Provider   atorvastatin (LIPITOR) 10 MG tablet Take 1 tablet by mouth daily Yes Tetonia Humphrey Lipps, APRN - CNP   ibuprofen (ADVIL;MOTRIN) 800 MG tablet Take 1 tablet by mouth every 8 hours as needed for Pain Yes Tetonia Humphrey Lipps, APRN - CNP   metFORMIN (GLUCOPHAGE) 1000 MG tablet Take 1 tablet by mouth 2 times daily (with meals) Yes Tetonia Humphrey Lipps, APRN - CNP   omeprazole (PRILOSEC) 40 MG delayed release capsule Take 1 capsule by mouth daily  Patient taking differently: Take 40 mg by mouth in the morning and at bedtime  Yes Minerva Kauffman APRN - CNP   aspirin EC 81 MG EC tablet Take 1 tablet by mouth daily Yes Tetonia Humphrey Lipps, APRN - CNP   blood glucose test strips (ASCENSIA AUTODISC VI;ONE TOUCH ULTRA TEST VI) strip 1 each by In Vitro route daily As needed. Yes Minerva Kauffman APRN - CNP   clobetasol (TEMOVATE) 0.05 % ointment Apply topically 2 times daily.  Yes Minerva Kauffman APRN - CNP   mineral oil-hydrophilic petrolatum (AQUAPHOR) Exam  Constitutional:       Appearance: Normal appearance. She is normal weight. HENT:      Head: Normocephalic. Right Ear: Hearing, tympanic membrane, ear canal and external ear normal.      Left Ear: Hearing, tympanic membrane, ear canal and external ear normal.      Nose: Nose normal.      Mouth/Throat:      Mouth: Mucous membranes are moist.      Tongue: No lesions. Pharynx: Oropharynx is clear. Uvula midline. Eyes:      General: Lids are normal. Lids are everted, no foreign bodies appreciated. Vision grossly intact. Gaze aligned appropriately. Neck:      Thyroid: No thyroid mass or thyromegaly. Cardiovascular:      Rate and Rhythm: Normal rate and regular rhythm. Heart sounds: Normal heart sounds. Pulmonary:      Effort: Pulmonary effort is normal.      Breath sounds: Normal breath sounds and air entry. Abdominal:      General: Abdomen is protuberant. Bowel sounds are normal.      Palpations: Abdomen is soft. Tenderness: There is abdominal tenderness in the epigastric area. Hernia: No hernia is present. There is no hernia in the ventral area, left inguinal area, right femoral area, left femoral area or right inguinal area. Musculoskeletal:      Right shoulder: Normal.      Left shoulder: Normal.      Right upper arm: Normal.      Left upper arm: Normal.      Cervical back: Normal and full passive range of motion without pain. Thoracic back: Normal.      Lumbar back: Normal.      Right hip: Normal.      Left hip: Normal.      Right upper leg: Normal.      Left upper leg: Normal.      Right knee: Normal.      Left knee: Normal.   Lymphadenopathy:      Head:      Right side of head: No submental, submandibular, tonsillar, preauricular, posterior auricular or occipital adenopathy. Left side of head: No submental, submandibular, tonsillar, preauricular, posterior auricular or occipital adenopathy. Cervical: No cervical adenopathy.       Right cervical: No superficial, deep or posterior cervical adenopathy. Left cervical: No superficial, deep or posterior cervical adenopathy. Skin:     General: Skin is warm and dry. Neurological:      Mental Status: She is alert and oriented to person, place, and time. GCS: GCS eye subscore is 4. GCS verbal subscore is 5. GCS motor subscore is 6. Cranial Nerves: Cranial nerves are intact. Motor: Motor function is intact. Coordination: Coordination is intact. Gait: Gait is intact. Deep Tendon Reflexes: Reflexes are normal and symmetric. Reflex Scores:       Tricep reflexes are 2+ on the right side and 2+ on the left side. Bicep reflexes are 2+ on the right side and 2+ on the left side. Brachioradialis reflexes are 2+ on the right side and 2+ on the left side. Patellar reflexes are 2+ on the right side and 2+ on the left side. Achilles reflexes are 2+ on the right side and 2+ on the left side. Kota Daniel was seen today for annual exam.    Diagnoses and all orders for this visit:    Encounter for well adult exam without abnormal findings    Type 2 diabetes mellitus without complication, without long-term current use of insulin [facility]  -     Hemoglobin A1C; Future  -     Lipid, Fasting; Future  -     dapagliflozin (FARXIGA) 10 MG tablet; Take 1 tablet by mouth every morning    Gastroesophageal reflux disease with esophagitis without hemorrhage  -     omeprazole (PRILOSEC) 40 MG delayed release capsule;  Take 1 capsule by mouth daily           Personalized Preventive Plan   Current Health Maintenance Status  Immunization History   Administered Date(s) Administered    COVID-19, PFIZER PURPLE top, DILUTE for use, (age 15 y+), 30mcg/0.3mL 03/31/2021, 04/20/2021    Influenza Vaccine, unspecified formulation 11/01/2016    Influenza, Quadv, IM, PF (6 mo and older Fluzone, Flulaval, Fluarix, and 3 yrs and older Afluria) 11/14/2017, 09/27/2018, 01/08/2020  Pneumococcal Polysaccharide ([id number]) 04/16/2018    Tdap (Boostrix, Adacel) 11/14/2017        Health Maintenance   Topic Date Due    Varicella vaccine (1 of 2 - 2-dose childhood series) Never done    HIV screen  Never done    Diabetic retinal exam  Never done    Hepatitis C screen  Never done    Hepatitis B vaccine (1 of 3 - Risk 3-dose series) Never done    Pneumococcal 0-64 years Vaccine (2 - PCV) 04/16/2019    Diabetic foot exam  09/08/2021    COVID-19 Vaccine (3 - Booster for Pfizer series) 09/20/2021    Cervical cancer screen  09/27/2021    A1C test (Diabetic or Prediabetic)  04/08/2022    Flu vaccine (1) 09/01/2022    Diabetic microalbuminuria test  01/12/2023    Depression Screen  01/12/2023    Lipids  01/14/2023    DTaP/Tdap/Td vaccine (2 - Td or Tdap) 11/14/2027    Hepatitis A vaccine  Aged Out    Hib vaccine  Aged Out    Meningococcal (ACWY) vaccine  Aged Out     Recommendations for Ordr.in Due: see orders and patient instructions/AVS.    No follow-ups on file.

## 2022-07-13 ENCOUNTER — NURSE ONLY (OUTPATIENT)
Dept: INTERNAL MEDICINE CLINIC | Age: 42
End: 2022-07-13
Payer: COMMERCIAL

## 2022-07-13 DIAGNOSIS — E11.9 TYPE 2 DIABETES MELLITUS WITHOUT COMPLICATION, WITHOUT LONG-TERM CURRENT USE OF INSULIN (HCC): ICD-10-CM

## 2022-07-13 DIAGNOSIS — Z23 NEED FOR PNEUMOCOCCAL VACCINE: Primary | ICD-10-CM

## 2022-07-13 LAB
CHOLESTEROL, FASTING: 117 MG/DL (ref 0–199)
HDLC SERPL-MCNC: 47 MG/DL (ref 40–60)
LDL CHOLESTEROL CALCULATED: 57 MG/DL
TRIGLYCERIDE, FASTING: 67 MG/DL (ref 0–150)
VLDLC SERPL CALC-MCNC: 13 MG/DL

## 2022-07-13 PROCEDURE — 90677 PCV20 VACCINE IM: CPT | Performed by: NURSE PRACTITIONER

## 2022-07-13 ASSESSMENT — VISUAL ACUITY: OU: 1

## 2022-07-14 LAB
ESTIMATED AVERAGE GLUCOSE: 142.7 MG/DL
HBA1C MFR BLD: 6.6 %

## 2022-08-29 DIAGNOSIS — E11.9 TYPE 2 DIABETES MELLITUS WITHOUT COMPLICATION, WITHOUT LONG-TERM CURRENT USE OF INSULIN (HCC): ICD-10-CM

## 2022-08-29 NOTE — TELEPHONE ENCOUNTER
Recent Visits  Date Type Provider Dept   07/12/22 Office Visit Patience Lezama APRN - CNP [facility]   01/12/22 Office Visit Patience Lezama, APRN - CNP [facility]   07/12/21 Office Visit Patience Lezama APRN - CNP [facility]   04/08/21 Office Visit Patience Lezama APRN - CNP [facility]   Showing recent visits within past 540 days with a meds authorizing provider and meeting all other requirements  Future Appointments  Date Type Provider Dept   10/17/22 Appointment Patience LezamaIVETTE - CNP [facility]   Showing future appointments within next 150 days with a meds authorizing provider and meeting all other requirements     7/12/2022